# Patient Record
Sex: FEMALE | Race: OTHER | HISPANIC OR LATINO | URBAN - METROPOLITAN AREA
[De-identification: names, ages, dates, MRNs, and addresses within clinical notes are randomized per-mention and may not be internally consistent; named-entity substitution may affect disease eponyms.]

---

## 2018-04-01 ENCOUNTER — EMERGENCY (EMERGENCY)
Facility: HOSPITAL | Age: 63
LOS: 1 days | Discharge: HOME | End: 2018-04-01
Attending: EMERGENCY MEDICINE | Admitting: EMERGENCY MEDICINE
Payer: COMMERCIAL

## 2018-04-01 VITALS
RESPIRATION RATE: 16 BRPM | TEMPERATURE: 98 F | DIASTOLIC BLOOD PRESSURE: 82 MMHG | HEART RATE: 82 BPM | OXYGEN SATURATION: 98 % | SYSTOLIC BLOOD PRESSURE: 117 MMHG

## 2018-04-01 VITALS — HEIGHT: 62 IN | WEIGHT: 149.91 LBS

## 2018-04-01 DIAGNOSIS — Z90.710 ACQUIRED ABSENCE OF BOTH CERVIX AND UTERUS: Chronic | ICD-10-CM

## 2018-04-01 PROCEDURE — 72100 X-RAY EXAM L-S SPINE 2/3 VWS: CPT

## 2018-04-01 PROCEDURE — 94640 AIRWAY INHALATION TREATMENT: CPT

## 2018-04-01 PROCEDURE — 71046 X-RAY EXAM CHEST 2 VIEWS: CPT | Mod: 26

## 2018-04-01 PROCEDURE — 71046 X-RAY EXAM CHEST 2 VIEWS: CPT

## 2018-04-01 PROCEDURE — 99284 EMERGENCY DEPT VISIT MOD MDM: CPT | Mod: 25

## 2018-04-01 PROCEDURE — 72100 X-RAY EXAM L-S SPINE 2/3 VWS: CPT | Mod: 26

## 2018-04-01 PROCEDURE — 72070 X-RAY EXAM THORAC SPINE 2VWS: CPT

## 2018-04-01 PROCEDURE — 99284 EMERGENCY DEPT VISIT MOD MDM: CPT

## 2018-04-01 PROCEDURE — 72070 X-RAY EXAM THORAC SPINE 2VWS: CPT | Mod: 26

## 2018-04-01 RX ORDER — CYCLOBENZAPRINE HYDROCHLORIDE 10 MG/1
1 TABLET, FILM COATED ORAL
Qty: 90 | Refills: 0
Start: 2018-04-01 | End: 2018-04-30

## 2018-04-01 RX ORDER — LORATADINE, PSEUDOEPHEDRINE SULFATE 5; 120 MG/1; MG/1
1 TABLET, FILM COATED, EXTENDED RELEASE ORAL
Qty: 60 | Refills: 0 | OUTPATIENT
Start: 2018-04-01 | End: 2018-04-30

## 2018-04-01 RX ORDER — IBUPROFEN 200 MG
1 TABLET ORAL
Qty: 120 | Refills: 0
Start: 2018-04-01 | End: 2018-04-30

## 2018-04-01 RX ORDER — LORATADINE 10 MG/1
10 TABLET ORAL DAILY
Qty: 0 | Refills: 0 | Status: DISCONTINUED | OUTPATIENT
Start: 2018-04-01 | End: 2018-04-06

## 2018-04-01 RX ORDER — ALBUTEROL 90 UG/1
2 AEROSOL, METERED ORAL
Qty: 1 | Refills: 0 | OUTPATIENT
Start: 2018-04-01 | End: 2018-04-30

## 2018-04-01 RX ORDER — IPRATROPIUM/ALBUTEROL SULFATE 18-103MCG
3 AEROSOL WITH ADAPTER (GRAM) INHALATION ONCE
Qty: 0 | Refills: 0 | Status: COMPLETED | OUTPATIENT
Start: 2018-04-01 | End: 2018-04-01

## 2018-04-01 RX ORDER — CYCLOBENZAPRINE HYDROCHLORIDE 10 MG/1
10 TABLET, FILM COATED ORAL ONCE
Qty: 0 | Refills: 0 | Status: COMPLETED | OUTPATIENT
Start: 2018-04-01 | End: 2018-04-01

## 2018-04-01 RX ORDER — KETOROLAC TROMETHAMINE 30 MG/ML
60 SYRINGE (ML) INJECTION ONCE
Qty: 0 | Refills: 0 | Status: DISCONTINUED | OUTPATIENT
Start: 2018-04-01 | End: 2018-04-01

## 2018-04-01 RX ADMIN — Medication 60 MILLIGRAM(S): at 22:19

## 2018-04-01 RX ADMIN — Medication 3 MILLILITER(S): at 21:45

## 2018-04-01 RX ADMIN — CYCLOBENZAPRINE HYDROCHLORIDE 10 MILLIGRAM(S): 10 TABLET, FILM COATED ORAL at 21:45

## 2018-04-01 RX ADMIN — Medication 60 MILLIGRAM(S): at 21:45

## 2018-04-01 RX ADMIN — Medication 1 TABLET(S): at 23:03

## 2018-04-01 RX ADMIN — LORATADINE 10 MILLIGRAM(S): 10 TABLET ORAL at 21:44

## 2018-04-01 NOTE — ED STATDOCS - CARE PLAN
Principal Discharge DX:	Bronchitis  Secondary Diagnosis:	Back contusion, unspecified laterality, initial encounter

## 2018-04-01 NOTE — ED STATDOCS - PROGRESS NOTE DETAILS
pt has clear lungs after the nebulizer treatment and she will be discharged with outpatient follow up

## 2018-04-01 NOTE — ED STATDOCS - OBJECTIVE STATEMENT
63 y/o F pt presents to the ED with c/o productive cough, nasal congestion, onset Thursday. Pt is also complaining of back pain which started with the cough however, she states that she fell last Tuesday on her back. Pt also had a fever a few days ago. Current smoker. No sick contacts. Denies chills, N/V/D, abd pain. No further complaints at this time.

## 2018-04-01 NOTE — ED ADULT NURSE NOTE - OBJECTIVE STATEMENT
Pt presents with c/o productive cough, nasal congestion x 3days, with associated back pain due to cough, also reports falling last Tuesday on her back. Denies sick contacts. Denies chills, N/V/D, abd pain. Offers no further complaints at this time.  MD order initiated.

## 2018-04-01 NOTE — ED ADULT NURSE REASSESSMENT NOTE - NS ED NURSE REASSESS COMMENT FT1
Pt reports feeling better, discharge instructions given and explained, including discharge medication purpose, dose, frequency and s/e, pt verbalized understanding of instructions, questions encouraged and answered appropriately, pt safely discharged home.

## 2018-11-23 ENCOUNTER — EMERGENCY (EMERGENCY)
Facility: HOSPITAL | Age: 63
LOS: 1 days | Discharge: HOME | End: 2018-11-23
Attending: EMERGENCY MEDICINE
Payer: COMMERCIAL

## 2018-11-23 VITALS — HEIGHT: 63 IN | WEIGHT: 154.98 LBS

## 2018-11-23 DIAGNOSIS — Z90.710 ACQUIRED ABSENCE OF BOTH CERVIX AND UTERUS: Chronic | ICD-10-CM

## 2018-11-23 LAB
ALBUMIN SERPL ELPH-MCNC: 4.3 G/DL — SIGNIFICANT CHANGE UP (ref 3.3–5.2)
ALP SERPL-CCNC: 57 U/L — SIGNIFICANT CHANGE UP (ref 40–120)
ALT FLD-CCNC: 14 U/L — SIGNIFICANT CHANGE UP
ANION GAP SERPL CALC-SCNC: 11 MMOL/L — SIGNIFICANT CHANGE UP (ref 5–17)
APPEARANCE UR: ABNORMAL
AST SERPL-CCNC: 18 U/L — SIGNIFICANT CHANGE UP
BACTERIA # UR AUTO: ABNORMAL
BASOPHILS # BLD AUTO: 0 K/UL — SIGNIFICANT CHANGE UP (ref 0–0.2)
BASOPHILS NFR BLD AUTO: 0.3 % — SIGNIFICANT CHANGE UP (ref 0–2)
BILIRUB SERPL-MCNC: <0.2 MG/DL — LOW (ref 0.4–2)
BILIRUB UR-MCNC: NEGATIVE — SIGNIFICANT CHANGE UP
BUN SERPL-MCNC: 22 MG/DL — HIGH (ref 8–20)
CALCIUM SERPL-MCNC: 9.3 MG/DL — SIGNIFICANT CHANGE UP (ref 8.6–10.2)
CHLORIDE SERPL-SCNC: 103 MMOL/L — SIGNIFICANT CHANGE UP (ref 98–107)
CO2 SERPL-SCNC: 30 MMOL/L — HIGH (ref 22–29)
COLOR SPEC: YELLOW — SIGNIFICANT CHANGE UP
COMMENT - URINE: SIGNIFICANT CHANGE UP
CREAT SERPL-MCNC: 1.15 MG/DL — SIGNIFICANT CHANGE UP (ref 0.5–1.3)
DIFF PNL FLD: ABNORMAL
EOSINOPHIL # BLD AUTO: 0.3 K/UL — SIGNIFICANT CHANGE UP (ref 0–0.5)
EOSINOPHIL NFR BLD AUTO: 7.6 % — HIGH (ref 0–6)
EPI CELLS # UR: SIGNIFICANT CHANGE UP
GLUCOSE SERPL-MCNC: 108 MG/DL — SIGNIFICANT CHANGE UP (ref 70–115)
GLUCOSE UR QL: NEGATIVE MG/DL — SIGNIFICANT CHANGE UP
HCT VFR BLD CALC: 39.1 % — SIGNIFICANT CHANGE UP (ref 37–47)
HGB BLD-MCNC: 12.5 G/DL — SIGNIFICANT CHANGE UP (ref 12–16)
KETONES UR-MCNC: ABNORMAL
LEUKOCYTE ESTERASE UR-ACNC: ABNORMAL
LIDOCAIN IGE QN: 42 U/L — SIGNIFICANT CHANGE UP (ref 22–51)
LYMPHOCYTES # BLD AUTO: 2 K/UL — SIGNIFICANT CHANGE UP (ref 1–4.8)
LYMPHOCYTES # BLD AUTO: 50.8 % — SIGNIFICANT CHANGE UP (ref 20–55)
MCHC RBC-ENTMCNC: 27.4 PG — SIGNIFICANT CHANGE UP (ref 27–31)
MCHC RBC-ENTMCNC: 32 G/DL — SIGNIFICANT CHANGE UP (ref 32–36)
MCV RBC AUTO: 85.6 FL — SIGNIFICANT CHANGE UP (ref 81–99)
MONOCYTES # BLD AUTO: 0.3 K/UL — SIGNIFICANT CHANGE UP (ref 0–0.8)
MONOCYTES NFR BLD AUTO: 8.6 % — SIGNIFICANT CHANGE UP (ref 3–10)
NEUTROPHILS # BLD AUTO: 1.3 K/UL — LOW (ref 1.8–8)
NEUTROPHILS NFR BLD AUTO: 32.7 % — LOW (ref 37–73)
NITRITE UR-MCNC: NEGATIVE — SIGNIFICANT CHANGE UP
PH UR: 8 — SIGNIFICANT CHANGE UP (ref 5–8)
PLATELET # BLD AUTO: 286 K/UL — SIGNIFICANT CHANGE UP (ref 150–400)
POTASSIUM SERPL-MCNC: 4.2 MMOL/L — SIGNIFICANT CHANGE UP (ref 3.5–5.3)
POTASSIUM SERPL-SCNC: 4.2 MMOL/L — SIGNIFICANT CHANGE UP (ref 3.5–5.3)
PROT SERPL-MCNC: 6.9 G/DL — SIGNIFICANT CHANGE UP (ref 6.6–8.7)
PROT UR-MCNC: 15 MG/DL
RBC # BLD: 4.57 M/UL — SIGNIFICANT CHANGE UP (ref 4.4–5.2)
RBC # FLD: 13.3 % — SIGNIFICANT CHANGE UP (ref 11–15.6)
RBC CASTS # UR COMP ASSIST: SIGNIFICANT CHANGE UP /HPF (ref 0–4)
SODIUM SERPL-SCNC: 144 MMOL/L — SIGNIFICANT CHANGE UP (ref 135–145)
SP GR SPEC: 1.01 — SIGNIFICANT CHANGE UP (ref 1.01–1.02)
UROBILINOGEN FLD QL: NEGATIVE MG/DL — SIGNIFICANT CHANGE UP
WBC # BLD: 3.9 K/UL — LOW (ref 4.8–10.8)
WBC # FLD AUTO: 3.9 K/UL — LOW (ref 4.8–10.8)
WBC UR QL: SIGNIFICANT CHANGE UP

## 2018-11-23 PROCEDURE — 99284 EMERGENCY DEPT VISIT MOD MDM: CPT

## 2018-11-23 PROCEDURE — 74177 CT ABD & PELVIS W/CONTRAST: CPT | Mod: 26

## 2018-11-23 RX ORDER — SODIUM CHLORIDE 9 MG/ML
3 INJECTION INTRAMUSCULAR; INTRAVENOUS; SUBCUTANEOUS ONCE
Qty: 0 | Refills: 0 | Status: COMPLETED | OUTPATIENT
Start: 2018-11-23 | End: 2018-11-23

## 2018-11-23 RX ORDER — KETOROLAC TROMETHAMINE 30 MG/ML
15 SYRINGE (ML) INJECTION ONCE
Qty: 0 | Refills: 0 | Status: DISCONTINUED | OUTPATIENT
Start: 2018-11-23 | End: 2018-11-23

## 2018-11-23 RX ADMIN — Medication 15 MILLIGRAM(S): at 23:16

## 2018-11-23 RX ADMIN — Medication 15 MILLIGRAM(S): at 21:31

## 2018-11-23 RX ADMIN — SODIUM CHLORIDE 3 MILLILITER(S): 9 INJECTION INTRAMUSCULAR; INTRAVENOUS; SUBCUTANEOUS at 20:32

## 2018-11-23 NOTE — ED PROVIDER NOTE - OBJECTIVE STATEMENT
Patient is a 62 y/o female with a pmhx of hysterectomy presenting with right sided abdominal pain that started this morning. Patient states abdominal pain radiates to the right lower back. Patient states the pain has been coming and going, but has become more severe since it started. Patient denies fevers. Patient denies cp, SOB, nausea, vomiting, diarrhea, constipation, melena, rectal bleeding, hematuria, dysuria. Patient denies past hx of kidney stones.

## 2018-11-23 NOTE — ED STATDOCS - OBJECTIVE STATEMENT
Telemedicine assessment was conducted (using real time 2 way audio-video technology) by Dr. Enmanuel Brady located at [address] [place] [address]  ++++++++++++++++++++++++  Pertinent patient history and initial plan: 64 y/o F pt with hx of hysterectomy (2012) presents to ED c/o intermittent R sided abdominal pain, with associated R flank pain from this morning. Pt's pain aggravates when lying down. Pt reports there has been some liquid bubbles in her urine. Pt states she drinks a lot of water. Pt does not currently take any medications daily. Pt took Ibuprofen and Aleve for her pain; no relief. NKDA. Denies fever, and rash to abdomen. No further complaints at this time.  PLAN: Basic labs, UA Telemedicine assessment was conducted (using real time 2 way audio-video technology) by Dr. Enmanuel Brady located at [address] [place] [address]  ++++++++++++++++++++++++  Pertinent patient history and initial plan: 62 y/o F pt with hx of hysterectomy (2012) presents to ED c/o intermittent R sided abdominal pain, with associated R flank pain from this morning. Pt's pain aggravates when lying down. Pt reports there has been some liquid bubbles in her urine. Pt states she drinks a lot of water. Pt does not currently take any medications daily. Pt took Ibuprofen and Aleve for her pain; no relief. NKDA. Denies fever, and rash to abdomen. No further complaints at this time.  PLAN: Basic labs, UA, imaging to be determined by on site physician    Patient seen by me in intake for initial assessment and ordering. Physician on site to follow results and further evaluate and treat patient.

## 2018-11-23 NOTE — ED ADULT NURSE NOTE - OBJECTIVE STATEMENT
assumed care of pt in room. pt a&ox3. pt c/o RLQ abdominal pain radiating to lower back starting this morning. denies chest pain or sob. last BM was yesterday, no diarrhea.

## 2018-11-23 NOTE — ED PROVIDER NOTE - PHYSICAL EXAMINATION
Const: Awake, alert and oriented. In no acute distress. Well appearing.  HEENT: NC/AT. Moist mucous membranes.  Eyes: Conjunctiva pink, sclera white bilaterally, EOMI.  Neck:. Soft and supple. Full ROM without pain.  Cardiac:  +S1/S2. No murmurs.   Resp: Speaking in full sentences. No evidence of respiratory distress. No wheezes, rales or rhonchi.  Abd: Soft, RLQ tenderness on palpation, non-distended. Normal bowel sounds in all 4 quadrants. No guarding or rebound.  Back: Spine midline and non-tender. No CVAT.  Skin: No rashes, abrasions or lacerations.  Lymph: No cervical lymphadenopathy.  Neuro: Awake, alert & oriented x 3. Moves all extremities symmetrically.

## 2018-11-23 NOTE — ED ADULT NURSE NOTE - NSIMPLEMENTINTERV_GEN_ALL_ED
Implemented All Universal Safety Interventions:  [place] to call system. Call bell, personal items and telephone within reach. Instruct patient to call for assistance. Room bathroom lighting operational. Non-slip footwear when patient is off stretcher. Physically safe environment: no spills, clutter or unnecessary equipment. Stretcher in lowest position, wheels locked, appropriate side rails in place.

## 2018-11-23 NOTE — ED PROVIDER NOTE - PROGRESS NOTE DETAILS
On re-assessment of patients abdomen, no tenderness on palpation, reviewed ct abd/pelvis, lab work and urine, GI referral, pt explained results and d/c instructions

## 2018-11-23 NOTE — ED PROVIDER NOTE - ATTENDING CONTRIBUTION TO CARE
Patient is a 62 y/o female seen and evaluated with ACP  Presents for abdominal pain, right sided  onset this am, radiates to back, intermittent  PMH includes hysterectomy  vitals reviewed, exam as documented  line, labs, urine for uti/pyelo, Ct for obstruction, appy  treat swms, check rsults, reassess

## 2018-11-24 VITALS
SYSTOLIC BLOOD PRESSURE: 146 MMHG | HEART RATE: 72 BPM | RESPIRATION RATE: 18 BRPM | TEMPERATURE: 98 F | DIASTOLIC BLOOD PRESSURE: 74 MMHG | OXYGEN SATURATION: 99 %

## 2018-11-24 LAB
CULTURE RESULTS: SIGNIFICANT CHANGE UP
SPECIMEN SOURCE: SIGNIFICANT CHANGE UP

## 2018-11-24 PROCEDURE — 80053 COMPREHEN METABOLIC PANEL: CPT

## 2018-11-24 PROCEDURE — 74177 CT ABD & PELVIS W/CONTRAST: CPT

## 2018-11-24 PROCEDURE — 36415 COLL VENOUS BLD VENIPUNCTURE: CPT

## 2018-11-24 PROCEDURE — 99284 EMERGENCY DEPT VISIT MOD MDM: CPT | Mod: 25

## 2018-11-24 PROCEDURE — T1013: CPT

## 2018-11-24 PROCEDURE — 87086 URINE CULTURE/COLONY COUNT: CPT

## 2018-11-24 PROCEDURE — 85027 COMPLETE CBC AUTOMATED: CPT

## 2018-11-24 PROCEDURE — 83690 ASSAY OF LIPASE: CPT

## 2018-11-24 PROCEDURE — 81001 URINALYSIS AUTO W/SCOPE: CPT

## 2018-11-24 PROCEDURE — 96374 THER/PROPH/DIAG INJ IV PUSH: CPT | Mod: XU

## 2018-12-04 ENCOUNTER — APPOINTMENT (OUTPATIENT)
Dept: GASTROENTEROLOGY | Facility: CLINIC | Age: 63
End: 2018-12-04

## 2018-12-17 ENCOUNTER — APPOINTMENT (OUTPATIENT)
Dept: UROGYNECOLOGY | Facility: CLINIC | Age: 63
End: 2018-12-17

## 2019-07-17 ENCOUNTER — EMERGENCY (EMERGENCY)
Facility: HOSPITAL | Age: 64
LOS: 1 days | Discharge: HOME | End: 2019-07-17
Attending: EMERGENCY MEDICINE
Payer: COMMERCIAL

## 2019-07-17 VITALS
RESPIRATION RATE: 20 BRPM | HEIGHT: 55 IN | SYSTOLIC BLOOD PRESSURE: 118 MMHG | OXYGEN SATURATION: 98 % | DIASTOLIC BLOOD PRESSURE: 63 MMHG | TEMPERATURE: 98 F | WEIGHT: 151.9 LBS | HEART RATE: 85 BPM

## 2019-07-17 DIAGNOSIS — Z90.710 ACQUIRED ABSENCE OF BOTH CERVIX AND UTERUS: Chronic | ICD-10-CM

## 2019-07-17 PROCEDURE — 73080 X-RAY EXAM OF ELBOW: CPT

## 2019-07-17 PROCEDURE — 73080 X-RAY EXAM OF ELBOW: CPT | Mod: 26,LT

## 2019-07-17 PROCEDURE — 99284 EMERGENCY DEPT VISIT MOD MDM: CPT

## 2019-07-17 PROCEDURE — 74176 CT ABD & PELVIS W/O CONTRAST: CPT | Mod: 26

## 2019-07-17 PROCEDURE — 70450 CT HEAD/BRAIN W/O DYE: CPT | Mod: 26

## 2019-07-17 PROCEDURE — 72125 CT NECK SPINE W/O DYE: CPT | Mod: 26

## 2019-07-17 PROCEDURE — 72125 CT NECK SPINE W/O DYE: CPT

## 2019-07-17 PROCEDURE — 70450 CT HEAD/BRAIN W/O DYE: CPT

## 2019-07-17 PROCEDURE — 74176 CT ABD & PELVIS W/O CONTRAST: CPT

## 2019-07-17 RX ORDER — ACETAMINOPHEN 500 MG
650 TABLET ORAL ONCE
Refills: 0 | Status: COMPLETED | OUTPATIENT
Start: 2019-07-17 | End: 2019-07-17

## 2019-07-17 RX ADMIN — Medication 650 MILLIGRAM(S): at 14:20

## 2019-07-17 NOTE — ED STATDOCS - PROGRESS NOTE DETAILS
Pt moved form intake Room. Pt seen and evaluated by intake Physician. HPI, Physical examination performed by intake Physician . Note reviewed and followup examination performed by me consistent with initial assessment. Agrees with intake Physician plan and tests. Head, Neck, abdomen and pelvis CT normal . Left elbow x-ray normal. Pt made aware of her result and D/C in stable condition. Pt advised about closed head injury and will followup if any symptoms of Intracranial injury kylie. Pt D/C and will F/U as discussed.

## 2019-07-17 NOTE — ED STATDOCS - OBJECTIVE STATEMENT
64 y/o F pt with no significant PMHx presents to the ED c/o back pain s/p mechanical fall. Falling backward hitting head HA, Low back pain, andelbow pain. no belly pain, no CP, no SOb   She reports that 64 y/o F pt with no significant PMHx presents to the ED c/o back pain and elbow pain s/p mechanical fall. Patient fell backwards out of chair, hitting head and lower back. Associated sx of HA. Denies belly pain, CP,  SOB. No further acute complaints at this time.

## 2019-07-17 NOTE — ED STATDOCS - CLINICAL SUMMARY MEDICAL DECISION MAKING FREE TEXT BOX
64 y/o F patient c/o lower back pain and L elbow pain s/p mechanical fall. Will get CT scan and reassess.

## 2019-07-17 NOTE — ED ADULT TRIAGE NOTE - CHIEF COMPLAINT QUOTE
Patient fell off chair at work ([employer]) landing on her back and hitting her head.  Patient ambulatory into ED.

## 2019-07-17 NOTE — ED STATDOCS - CARE PLAN
Principal Discharge DX:	Fall, initial encounter  Assessment and plan of treatment:	Continue with Naproxen as discussed   F/U with PCP  Secondary Diagnosis:	Closed head injury, initial encounter  Secondary Diagnosis:	Neck pain  Secondary Diagnosis:	Elbow pain, left

## 2019-07-17 NOTE — ED STATDOCS - ATTENDING CONTRIBUTION TO CARE
I, Kody Rodriguez, performed the initial face to face bedside interview with this patient regarding history of present illness, review of symptoms and relevant past medical, social and family history.  I completed an independent physical examination.  I was the initial provider who evaluated this patient. I have signed out the follow up of any pending tests (i.e. labs, radiological studies) to the ACP.  I have communicated the patient’s plan of care and disposition with the ACP.  The history, relevant review of systems, past medical and surgical history, medical decision making, and physical examination was documented by the scribe in my presence and I attest to the accuracy of the documentation.

## 2019-07-17 NOTE — ED ADULT NURSE NOTE - NSIMPLEMENTINTERV_GEN_ALL_ED
Implemented All Universal Safety Interventions:  [place] to call system. Call bell, personal items and telephone within reach. Instruct patient to call for assistance. Room bathroom lighting operational. Non-slip footwear when patient is off stretcher. Physically safe environment: no spills, clutter or unnecessary equipment. Stretcher in lowest position, wheels locked, appropriate side rails in place.

## 2019-09-23 ENCOUNTER — APPOINTMENT (OUTPATIENT)
Dept: UROGYNECOLOGY | Facility: CLINIC | Age: 64
End: 2019-09-23

## 2021-08-18 ENCOUNTER — EMERGENCY (EMERGENCY)
Facility: HOSPITAL | Age: 66
LOS: 1 days | Discharge: HOME | End: 2021-08-18
Attending: EMERGENCY MEDICINE
Payer: COMMERCIAL

## 2021-08-18 VITALS
WEIGHT: 147.93 LBS | HEIGHT: 64 IN | HEART RATE: 76 BPM | SYSTOLIC BLOOD PRESSURE: 134 MMHG | RESPIRATION RATE: 18 BRPM | DIASTOLIC BLOOD PRESSURE: 89 MMHG | OXYGEN SATURATION: 100 % | TEMPERATURE: 98 F

## 2021-08-18 DIAGNOSIS — Z90.710 ACQUIRED ABSENCE OF BOTH CERVIX AND UTERUS: Chronic | ICD-10-CM

## 2021-08-18 LAB
ALBUMIN SERPL ELPH-MCNC: 4.4 G/DL — SIGNIFICANT CHANGE UP (ref 3.3–5.2)
ALP SERPL-CCNC: 57 U/L — SIGNIFICANT CHANGE UP (ref 40–120)
ALT FLD-CCNC: 12 U/L — SIGNIFICANT CHANGE UP
ANION GAP SERPL CALC-SCNC: 9 MMOL/L — SIGNIFICANT CHANGE UP (ref 5–17)
APPEARANCE UR: CLEAR — SIGNIFICANT CHANGE UP
AST SERPL-CCNC: 19 U/L — SIGNIFICANT CHANGE UP
BACTERIA # UR AUTO: ABNORMAL
BASOPHILS # BLD AUTO: 0.01 K/UL — SIGNIFICANT CHANGE UP (ref 0–0.2)
BASOPHILS NFR BLD AUTO: 0.3 % — SIGNIFICANT CHANGE UP (ref 0–2)
BILIRUB SERPL-MCNC: <0.2 MG/DL — LOW (ref 0.4–2)
BILIRUB UR-MCNC: NEGATIVE — SIGNIFICANT CHANGE UP
BUN SERPL-MCNC: 14 MG/DL — SIGNIFICANT CHANGE UP (ref 8–20)
CALCIUM SERPL-MCNC: 9.3 MG/DL — SIGNIFICANT CHANGE UP (ref 8.6–10.2)
CHLORIDE SERPL-SCNC: 106 MMOL/L — SIGNIFICANT CHANGE UP (ref 98–107)
CO2 SERPL-SCNC: 27 MMOL/L — SIGNIFICANT CHANGE UP (ref 22–29)
COLOR SPEC: YELLOW — SIGNIFICANT CHANGE UP
CREAT SERPL-MCNC: 0.81 MG/DL — SIGNIFICANT CHANGE UP (ref 0.5–1.3)
CRP SERPL-MCNC: <4 MG/L — SIGNIFICANT CHANGE UP
DIFF PNL FLD: ABNORMAL
EOSINOPHIL # BLD AUTO: 0.18 K/UL — SIGNIFICANT CHANGE UP (ref 0–0.5)
EOSINOPHIL NFR BLD AUTO: 5.5 % — SIGNIFICANT CHANGE UP (ref 0–6)
EPI CELLS # UR: SIGNIFICANT CHANGE UP
GLUCOSE SERPL-MCNC: 83 MG/DL — SIGNIFICANT CHANGE UP (ref 70–99)
GLUCOSE UR QL: NEGATIVE MG/DL — SIGNIFICANT CHANGE UP
HCG SERPL-ACNC: <4 MIU/ML — SIGNIFICANT CHANGE UP
HCT VFR BLD CALC: 39 % — SIGNIFICANT CHANGE UP (ref 34.5–45)
HGB BLD-MCNC: 12.5 G/DL — SIGNIFICANT CHANGE UP (ref 11.5–15.5)
IMM GRANULOCYTES NFR BLD AUTO: 0.3 % — SIGNIFICANT CHANGE UP (ref 0–1.5)
KETONES UR-MCNC: NEGATIVE — SIGNIFICANT CHANGE UP
LEUKOCYTE ESTERASE UR-ACNC: ABNORMAL
LIDOCAIN IGE QN: 36 U/L — SIGNIFICANT CHANGE UP (ref 22–51)
LYMPHOCYTES # BLD AUTO: 1.65 K/UL — SIGNIFICANT CHANGE UP (ref 1–3.3)
LYMPHOCYTES # BLD AUTO: 50.6 % — HIGH (ref 13–44)
MAGNESIUM SERPL-MCNC: 2.4 MG/DL — SIGNIFICANT CHANGE UP (ref 1.6–2.6)
MCHC RBC-ENTMCNC: 27.3 PG — SIGNIFICANT CHANGE UP (ref 27–34)
MCHC RBC-ENTMCNC: 32.1 GM/DL — SIGNIFICANT CHANGE UP (ref 32–36)
MCV RBC AUTO: 85.2 FL — SIGNIFICANT CHANGE UP (ref 80–100)
MONOCYTES # BLD AUTO: 0.29 K/UL — SIGNIFICANT CHANGE UP (ref 0–0.9)
MONOCYTES NFR BLD AUTO: 8.9 % — SIGNIFICANT CHANGE UP (ref 2–14)
NEUTROPHILS # BLD AUTO: 1.12 K/UL — LOW (ref 1.8–7.4)
NEUTROPHILS NFR BLD AUTO: 34.4 % — LOW (ref 43–77)
NITRITE UR-MCNC: NEGATIVE — SIGNIFICANT CHANGE UP
PH UR: 6.5 — SIGNIFICANT CHANGE UP (ref 5–8)
PLATELET # BLD AUTO: 262 K/UL — SIGNIFICANT CHANGE UP (ref 150–400)
POTASSIUM SERPL-MCNC: 3.8 MMOL/L — SIGNIFICANT CHANGE UP (ref 3.5–5.3)
POTASSIUM SERPL-SCNC: 3.8 MMOL/L — SIGNIFICANT CHANGE UP (ref 3.5–5.3)
PROCALCITONIN SERPL-MCNC: 0.04 NG/ML — SIGNIFICANT CHANGE UP (ref 0.02–0.1)
PROT SERPL-MCNC: 6.8 G/DL — SIGNIFICANT CHANGE UP (ref 6.6–8.7)
PROT UR-MCNC: NEGATIVE MG/DL — SIGNIFICANT CHANGE UP
RAPID RVP RESULT: SIGNIFICANT CHANGE UP
RBC # BLD: 4.58 M/UL — SIGNIFICANT CHANGE UP (ref 3.8–5.2)
RBC # FLD: 13 % — SIGNIFICANT CHANGE UP (ref 10.3–14.5)
RBC CASTS # UR COMP ASSIST: SIGNIFICANT CHANGE UP /HPF (ref 0–4)
SARS-COV-2 RNA SPEC QL NAA+PROBE: DETECTED
SODIUM SERPL-SCNC: 142 MMOL/L — SIGNIFICANT CHANGE UP (ref 135–145)
SP GR SPEC: 1.01 — SIGNIFICANT CHANGE UP (ref 1.01–1.02)
UROBILINOGEN FLD QL: NEGATIVE MG/DL — SIGNIFICANT CHANGE UP
WBC # BLD: 3.26 K/UL — LOW (ref 3.8–10.5)
WBC # FLD AUTO: 3.26 K/UL — LOW (ref 3.8–10.5)
WBC UR QL: SIGNIFICANT CHANGE UP

## 2021-08-18 PROCEDURE — 71260 CT THORAX DX C+: CPT | Mod: 26,MA

## 2021-08-18 PROCEDURE — 99285 EMERGENCY DEPT VISIT HI MDM: CPT

## 2021-08-18 PROCEDURE — 71045 X-RAY EXAM CHEST 1 VIEW: CPT | Mod: 26

## 2021-08-18 PROCEDURE — 74177 CT ABD & PELVIS W/CONTRAST: CPT | Mod: 26,MA

## 2021-08-18 NOTE — ED PROVIDER NOTE - OBJECTIVE STATEMENT
66yof w/ no PMH referred to ED by PMD for low WBC. She has been feeling weak, tired and generally unwell for about two weeks now, poor appetite, intermittent dull RUQ pain that comes and goes without any provoking or palliating factors. Had labs done by her PMD which revealed a WBC 2.5k. Endorses some mild congestion and some cervical adenopathy but no cough, chills, fever, chest pain, shortness of breath.

## 2021-08-18 NOTE — ED ADULT TRIAGE NOTE - CHIEF COMPLAINT QUOTE
pt reports WBC 2.5 at PMD, feeling no energy, dizzy, sweating a lot, RUQ pain, SOB. symptoms x2 weeks

## 2021-08-18 NOTE — ED PROVIDER NOTE - NSFOLLOWUPINSTRUCTIONS_ED_ALL_ED_FT
Start protonix 40mg once daily before breakfast for 14 days.   Follow up with your primary doctor and hematology as arranged  Maintain isolation precautions until repeat COVID testing is negative or 10 days from positive result.

## 2021-08-18 NOTE — ED PROVIDER NOTE - ENMT, MLM
Airway patent, Nasal mucosa clear. Mouth with normal mucosa. Throat has no vesicles, no oropharyngeal exudates and uvula is midline. Small, freely mobile cervical nodes

## 2021-08-18 NOTE — ED PROVIDER NOTE - PATIENT PORTAL LINK FT
You can access the FollowMyHealth Patient Portal offered by [facility] by registering at the following website: [email or web address]. By joining [employer]’s FollowMyHealth portal, you will also be able to view your health information using other applications (apps) compatible with our system.

## 2021-08-18 NOTE — ED PROVIDER NOTE - CLINICAL SUMMARY MEDICAL DECISION MAKING FREE TEXT BOX
General malaise with leukopenia on outside labs, maybe some occasional cervical adenopathy but overall unremarkable exam, WBC 3.2 today with ANC>1000, no blasts reported on differential. CT performed shows no evidence of infection in chest or abdomen, no suspicious lymphadenopathy. Incidental finding of gastritis which likely explains bloating/abd discomfort. Will start protonix. Sent HIV, EBV, Hepatitis to evaluate for other causes of leukopenia, but found to be COVID positive. Pt has follow up with hematology arranged, made aware of all results and pending tests to follow up.

## 2021-08-19 VITALS
OXYGEN SATURATION: 98 % | HEART RATE: 66 BPM | RESPIRATION RATE: 19 BRPM | SYSTOLIC BLOOD PRESSURE: 125 MMHG | DIASTOLIC BLOOD PRESSURE: 84 MMHG | TEMPERATURE: 98 F

## 2021-08-19 LAB
EBV EA AB SER IA-ACNC: 8.8 U/ML — SIGNIFICANT CHANGE UP
EBV EA AB TITR SER IF: POSITIVE
EBV EA IGG SER-ACNC: NEGATIVE — SIGNIFICANT CHANGE UP
EBV NA IGG SER IA-ACNC: >600 U/ML — HIGH
EBV PATRN SPEC IB-IMP: SIGNIFICANT CHANGE UP
EBV VCA IGG AVIDITY SER QL IA: POSITIVE
EBV VCA IGM SER IA-ACNC: <10 U/ML — SIGNIFICANT CHANGE UP
EBV VCA IGM SER IA-ACNC: >750 U/ML — HIGH
EBV VCA IGM TITR FLD: NEGATIVE — SIGNIFICANT CHANGE UP
HAV IGM SER-ACNC: SIGNIFICANT CHANGE UP
HBV CORE IGM SER-ACNC: SIGNIFICANT CHANGE UP
HBV SURFACE AG SER-ACNC: SIGNIFICANT CHANGE UP
HCV AB S/CO SERPL IA: 0.15 S/CO — SIGNIFICANT CHANGE UP (ref 0–0.99)
HCV AB SERPL-IMP: SIGNIFICANT CHANGE UP
HIV 1 & 2 AB SERPL IA.RAPID: SIGNIFICANT CHANGE UP

## 2021-08-19 PROCEDURE — 81001 URINALYSIS AUTO W/SCOPE: CPT

## 2021-08-19 PROCEDURE — 86703 HIV-1/HIV-2 1 RESULT ANTBDY: CPT

## 2021-08-19 PROCEDURE — 83690 ASSAY OF LIPASE: CPT

## 2021-08-19 PROCEDURE — 87086 URINE CULTURE/COLONY COUNT: CPT

## 2021-08-19 PROCEDURE — 71260 CT THORAX DX C+: CPT | Mod: MA

## 2021-08-19 PROCEDURE — 84702 CHORIONIC GONADOTROPIN TEST: CPT

## 2021-08-19 PROCEDURE — 74177 CT ABD & PELVIS W/CONTRAST: CPT | Mod: MA

## 2021-08-19 PROCEDURE — 86664 EPSTEIN-BARR NUCLEAR ANTIGEN: CPT

## 2021-08-19 PROCEDURE — 86663 EPSTEIN-BARR ANTIBODY: CPT

## 2021-08-19 PROCEDURE — 36415 COLL VENOUS BLD VENIPUNCTURE: CPT

## 2021-08-19 PROCEDURE — 86140 C-REACTIVE PROTEIN: CPT

## 2021-08-19 PROCEDURE — 84145 PROCALCITONIN (PCT): CPT

## 2021-08-19 PROCEDURE — 85025 COMPLETE CBC W/AUTO DIFF WBC: CPT

## 2021-08-19 PROCEDURE — 83735 ASSAY OF MAGNESIUM: CPT

## 2021-08-19 PROCEDURE — 86665 EPSTEIN-BARR CAPSID VCA: CPT

## 2021-08-19 PROCEDURE — 80053 COMPREHEN METABOLIC PANEL: CPT

## 2021-08-19 PROCEDURE — 80074 ACUTE HEPATITIS PANEL: CPT

## 2021-08-19 PROCEDURE — 71045 X-RAY EXAM CHEST 1 VIEW: CPT

## 2021-08-19 PROCEDURE — 0225U NFCT DS DNA&RNA 21 SARSCOV2: CPT

## 2021-08-19 PROCEDURE — 99284 EMERGENCY DEPT VISIT MOD MDM: CPT | Mod: 25

## 2021-08-19 RX ORDER — PANTOPRAZOLE SODIUM 20 MG/1
1 TABLET, DELAYED RELEASE ORAL
Qty: 14 | Refills: 0
Start: 2021-08-19 | End: 2021-09-01

## 2021-08-20 LAB
CULTURE RESULTS: SIGNIFICANT CHANGE UP
SPECIMEN SOURCE: SIGNIFICANT CHANGE UP

## 2021-09-08 ENCOUNTER — APPOINTMENT (OUTPATIENT)
Dept: GASTROENTEROLOGY | Facility: CLINIC | Age: 66
End: 2021-09-08

## 2021-10-05 ENCOUNTER — EMERGENCY (EMERGENCY)
Facility: HOSPITAL | Age: 66
LOS: 1 days | Discharge: HOME | End: 2021-10-05
Attending: EMERGENCY MEDICINE
Payer: COMMERCIAL

## 2021-10-05 VITALS
SYSTOLIC BLOOD PRESSURE: 147 MMHG | HEART RATE: 62 BPM | RESPIRATION RATE: 18 BRPM | HEIGHT: 64 IN | TEMPERATURE: 98 F | OXYGEN SATURATION: 100 % | WEIGHT: 149.91 LBS | DIASTOLIC BLOOD PRESSURE: 89 MMHG

## 2021-10-05 DIAGNOSIS — Z90.710 ACQUIRED ABSENCE OF BOTH CERVIX AND UTERUS: Chronic | ICD-10-CM

## 2021-10-05 LAB
ALBUMIN SERPL ELPH-MCNC: 4.4 G/DL — SIGNIFICANT CHANGE UP (ref 3.3–5.2)
ALP SERPL-CCNC: 56 U/L — SIGNIFICANT CHANGE UP (ref 40–120)
ALT FLD-CCNC: 11 U/L — SIGNIFICANT CHANGE UP
ANION GAP SERPL CALC-SCNC: 11 MMOL/L — SIGNIFICANT CHANGE UP (ref 5–17)
AST SERPL-CCNC: 17 U/L — SIGNIFICANT CHANGE UP
BASOPHILS # BLD AUTO: 0 K/UL — SIGNIFICANT CHANGE UP (ref 0–0.2)
BASOPHILS NFR BLD AUTO: 0 % — SIGNIFICANT CHANGE UP (ref 0–2)
BILIRUB SERPL-MCNC: <0.2 MG/DL — LOW (ref 0.4–2)
BUN SERPL-MCNC: 12.6 MG/DL — SIGNIFICANT CHANGE UP (ref 8–20)
CALCIUM SERPL-MCNC: 9.6 MG/DL — SIGNIFICANT CHANGE UP (ref 8.6–10.2)
CHLORIDE SERPL-SCNC: 106 MMOL/L — SIGNIFICANT CHANGE UP (ref 98–107)
CO2 SERPL-SCNC: 28 MMOL/L — SIGNIFICANT CHANGE UP (ref 22–29)
CREAT SERPL-MCNC: 0.87 MG/DL — SIGNIFICANT CHANGE UP (ref 0.5–1.3)
EOSINOPHIL # BLD AUTO: 0.15 K/UL — SIGNIFICANT CHANGE UP (ref 0–0.5)
EOSINOPHIL NFR BLD AUTO: 4.5 % — SIGNIFICANT CHANGE UP (ref 0–6)
GIANT PLATELETS BLD QL SMEAR: PRESENT — SIGNIFICANT CHANGE UP
GLUCOSE SERPL-MCNC: 104 MG/DL — HIGH (ref 70–99)
HCT VFR BLD CALC: 38 % — SIGNIFICANT CHANGE UP (ref 34.5–45)
HGB BLD-MCNC: 12.1 G/DL — SIGNIFICANT CHANGE UP (ref 11.5–15.5)
LYMPHOCYTES # BLD AUTO: 1.38 K/UL — SIGNIFICANT CHANGE UP (ref 1–3.3)
LYMPHOCYTES # BLD AUTO: 40.2 % — SIGNIFICANT CHANGE UP (ref 13–44)
MANUAL SMEAR VERIFICATION: SIGNIFICANT CHANGE UP
MCHC RBC-ENTMCNC: 27.4 PG — SIGNIFICANT CHANGE UP (ref 27–34)
MCHC RBC-ENTMCNC: 31.8 GM/DL — LOW (ref 32–36)
MCV RBC AUTO: 86.2 FL — SIGNIFICANT CHANGE UP (ref 80–100)
MONOCYTES # BLD AUTO: 0.21 K/UL — SIGNIFICANT CHANGE UP (ref 0–0.9)
MONOCYTES NFR BLD AUTO: 6.2 % — SIGNIFICANT CHANGE UP (ref 2–14)
NEUTROPHILS # BLD AUTO: 1.65 K/UL — LOW (ref 1.8–7.4)
NEUTROPHILS NFR BLD AUTO: 48.2 % — SIGNIFICANT CHANGE UP (ref 43–77)
PLAT MORPH BLD: NORMAL — SIGNIFICANT CHANGE UP
PLATELET # BLD AUTO: 279 K/UL — SIGNIFICANT CHANGE UP (ref 150–400)
POTASSIUM SERPL-MCNC: 3.7 MMOL/L — SIGNIFICANT CHANGE UP (ref 3.5–5.3)
POTASSIUM SERPL-SCNC: 3.7 MMOL/L — SIGNIFICANT CHANGE UP (ref 3.5–5.3)
PROT SERPL-MCNC: 6.6 G/DL — SIGNIFICANT CHANGE UP (ref 6.6–8.7)
RBC # BLD: 4.41 M/UL — SIGNIFICANT CHANGE UP (ref 3.8–5.2)
RBC # FLD: 12.9 % — SIGNIFICANT CHANGE UP (ref 10.3–14.5)
RBC BLD AUTO: NORMAL — SIGNIFICANT CHANGE UP
SMUDGE CELLS # BLD: PRESENT — SIGNIFICANT CHANGE UP
SODIUM SERPL-SCNC: 145 MMOL/L — SIGNIFICANT CHANGE UP (ref 135–145)
VARIANT LYMPHS # BLD: 0.9 % — SIGNIFICANT CHANGE UP (ref 0–6)
WBC # BLD: 3.43 K/UL — LOW (ref 3.8–10.5)
WBC # FLD AUTO: 3.43 K/UL — LOW (ref 3.8–10.5)

## 2021-10-05 PROCEDURE — 99285 EMERGENCY DEPT VISIT HI MDM: CPT

## 2021-10-05 PROCEDURE — 93010 ELECTROCARDIOGRAM REPORT: CPT

## 2021-10-05 RX ORDER — METOCLOPRAMIDE HCL 10 MG
10 TABLET ORAL ONCE
Refills: 0 | Status: COMPLETED | OUTPATIENT
Start: 2021-10-05 | End: 2021-10-05

## 2021-10-05 RX ORDER — SODIUM CHLORIDE 9 MG/ML
1000 INJECTION, SOLUTION INTRAVENOUS ONCE
Refills: 0 | Status: COMPLETED | OUTPATIENT
Start: 2021-10-05 | End: 2021-10-05

## 2021-10-05 RX ORDER — ONDANSETRON 8 MG/1
4 TABLET, FILM COATED ORAL ONCE
Refills: 0 | Status: COMPLETED | OUTPATIENT
Start: 2021-10-05 | End: 2021-10-05

## 2021-10-05 RX ORDER — ALPRAZOLAM 0.25 MG
0.25 TABLET ORAL ONCE
Refills: 0 | Status: DISCONTINUED | OUTPATIENT
Start: 2021-10-05 | End: 2021-10-05

## 2021-10-05 RX ORDER — MECLIZINE HCL 12.5 MG
50 TABLET ORAL ONCE
Refills: 0 | Status: COMPLETED | OUTPATIENT
Start: 2021-10-05 | End: 2021-10-05

## 2021-10-05 RX ADMIN — Medication 10 MILLIGRAM(S): at 20:38

## 2021-10-05 RX ADMIN — Medication 50 MILLIGRAM(S): at 20:37

## 2021-10-05 RX ADMIN — Medication 0.25 MILLIGRAM(S): at 20:37

## 2021-10-05 RX ADMIN — ONDANSETRON 4 MILLIGRAM(S): 8 TABLET, FILM COATED ORAL at 20:37

## 2021-10-05 RX ADMIN — SODIUM CHLORIDE 1000 MILLILITER(S): 9 INJECTION, SOLUTION INTRAVENOUS at 20:38

## 2021-10-05 NOTE — ED PROVIDER NOTE - ATTENDING CONTRIBUTION TO CARE
Myra: I performed a face to face bedside interview with patient regarding history of present illness, review of symptoms and past medical history. I completed an independent physical exam.  I have discussed patient's plan of care with resident.   I agree with note as stated above including HISTORY OF PRESENT ILLNESS, HIV, PAST MEDICAL/SURGICAL/FAMILY/SOCIAL HISTORY, ALLERGIES AND HOME MEDICATIONS, REVIEW OF SYSTEMS, PHYSICAL EXAM, MEDICAL DECISION MAKING and any PROGRESS NOTES during the time I functioned as the attending physician for this patient unless otherwise noted. My brief assessment is as follows: 66F h/o sinusitis p/w dizziness described as room spinning sensation worse with head movement x 2 days, one episode of NBNB emesis today. Deneis slurred speech, weakness, numbness, tingling.   Exam notable for right beating extinguishable horizontal nystagmus on L gaze. Otherwise benign neuro exam. Likely peripheral vertigo. Plan for labs, CTH, CTA, symptom control, reassess.

## 2021-10-05 NOTE — ED PROVIDER NOTE - CLINICAL SUMMARY MEDICAL DECISION MAKING FREE TEXT BOX
65yo female with history of chronic sinusitis presenting with dizziness x 2 days with vomiting with out falls, trauma, fevers, chills. Left gaze evoked nystagmus and dizziness with positional head movement on examination. Concerning for new onset vertigo. will order labs and imaging and medications. Will reassess.

## 2021-10-05 NOTE — ED ADULT NURSE NOTE - NSIMPLEMENTINTERV_GEN_ALL_ED
Implemented All Fall Risk Interventions:  [place] to call system. Call bell, personal items and telephone within reach. Instruct patient to call for assistance. Room bathroom lighting operational. Non-slip footwear when patient is off stretcher. Physically safe environment: no spills, clutter or unnecessary equipment. Stretcher in lowest position, wheels locked, appropriate side rails in place. Provide visual cue, wrist band, yellow gown, etc. Monitor gait and stability. Monitor for mental status changes and reorient to person, place, and time. Review medications for side effects contributing to fall risk. Reinforce activity limits and safety measures with patient and family.

## 2021-10-05 NOTE — ED PROVIDER NOTE - NSFOLLOWUPINSTRUCTIONS_ED_ALL_ED_FT
- Follow up with your primary care doctor within 1-3 days.   - Consider seeing a neurologist, see information above.   - Bring results with you to the appointment.   - Take Meclizine 25mg every 6-8 hours as needed for dizziness.   - Return to the Emergency Department for new or worsening symptoms.     Dizziness    Dizziness can manifest as a feeling of unsteadiness or light-headedness. You may feel like you are about to faint. This condition can be caused by a number of things, including medicines, dehydration, or illness. Drink enough fluid to keep your urine clear or pale yellow. Do not drink alcohol and limit your caffeine intake. Avoid quick or sudden movements.  Rise slowly from chairs and steady yourself until you feel okay. In the morning, first sit up on the side of the bed.    SEEK IMMEDIATE MEDICAL CARE IF YOU HAVE ANY OF THE FOLLOWING SYMPTOMS: vomiting, changes in your vision or speech, weakness in your arms or legs, trouble speaking or swallowing, chest pain, abdominal pain, shortness of breath, sweating, bleeding, headache, neck pain, or fever.

## 2021-10-05 NOTE — ED PROVIDER NOTE - OBJECTIVE STATEMENT
65yo female with history of chronic sinusitis presenting with dizziness x 2 days with vomiting. Patient states that she becomes dizzy with head movement which caused her to be nausea and vomit 1 time. Patient denies history of vertigo and denies having these symptoms in the past. Denies falls, headache, fevers, chills, chest pain, shortness of breath, cough, urinary symptoms, focal neurologic symptoms. Patient is following PCP and supposed to see ENT for recurrent sinus infection.

## 2021-10-05 NOTE — ED ADULT TRIAGE NOTE - CHIEF COMPLAINT QUOTE
pt BIBA c/o sinus infection, nasal congestion, nausea, lightheadedness. pt reports feels better when sitting. had similar sx last week and has f/u appt in 2 weeks. ambulatory. sensation intact, no numbness/tinlging. ANAYA X 4

## 2021-10-05 NOTE — ED PROVIDER NOTE - PATIENT PORTAL LINK FT
You can access the FollowMyHealth Patient Portal offered by [facility] by registering at the following website: [email or web address]. By joining [employer]’s FollowMyHealth portal, you will also be able to view your health information using other applications (apps) compatible with our system.

## 2021-10-05 NOTE — ED PROVIDER NOTE - CARE PROVIDERS DIRECT ADDRESSES
LOV 2/19/2021    LAST LAB 12/21/2020    LAST RX   lisinopril 5 MG Oral Tab 90 tablet 0 1/19/2021         Next OV   Future Appointments   Date Time Provider Geoff Zamora   6/15/2021 11:40 AM Milady Norris MD [facility]   9/30/2021  1:00 PM Und
[email or web address]

## 2021-10-05 NOTE — ED PROVIDER NOTE - PHYSICAL EXAMINATION
General: Well appearing in no acute distress. Alert and cooperative.   Head: Normocephalic, atraumatic.  Eyes: PERRLA. No conjunctival injection. No scleral icterus. EOMI  ENMT: Atraumatic external nose and ears.  Neck: Soft and supple. Full ROM without pain. No midline tenderness.  Cardiac: Regular rate and regular rhythm. No murmurs, rubs, gallops. No LE edema.  Resp: Unlabored respiratory effort. Lungs CTAB. Speaking in full sentences. No wheezes, rales or rhonchi.  Abd: Soft, non-tender, non-distended. No guarding or rebound. No scars, masses, or lesions.  MSK: Spine midline and non-tender. No CVA tenderness.    Skin: Warm and dry. No rashes, abrasions, or lacerations.  Neuro: Left gaze evoked nystagmus. AO x 3. Moves all extremities symmetrically. Motor strength and sensation grossly intact.

## 2021-10-05 NOTE — ED PROVIDER NOTE - PROGRESS NOTE DETAILS
On reassessment patient states symptoms improved. Pending imaging. -Yanet,  Myra COPELAND: symptoms resolved, imaging negative. Will follow with neurology. Return precautions given.

## 2021-10-05 NOTE — ED PROVIDER NOTE - CARE PROVIDER_API CALL
Allan Hallman; PhD)  Neurology; Vascular Neurology  [address] [facility] [facility] [address]  [place] [address]  Phone: [phone]  Fax: [phone]  Follow Up Time: 4-6 Days

## 2021-10-06 VITALS
HEART RATE: 61 BPM | TEMPERATURE: 98 F | SYSTOLIC BLOOD PRESSURE: 112 MMHG | RESPIRATION RATE: 18 BRPM | OXYGEN SATURATION: 100 % | DIASTOLIC BLOOD PRESSURE: 73 MMHG

## 2021-10-06 PROCEDURE — 36415 COLL VENOUS BLD VENIPUNCTURE: CPT

## 2021-10-06 PROCEDURE — 96374 THER/PROPH/DIAG INJ IV PUSH: CPT | Mod: XU

## 2021-10-06 PROCEDURE — 99284 EMERGENCY DEPT VISIT MOD MDM: CPT | Mod: 25

## 2021-10-06 PROCEDURE — 93005 ELECTROCARDIOGRAM TRACING: CPT

## 2021-10-06 PROCEDURE — 85025 COMPLETE CBC W/AUTO DIFF WBC: CPT

## 2021-10-06 PROCEDURE — 80053 COMPREHEN METABOLIC PANEL: CPT

## 2021-10-06 PROCEDURE — 70498 CT ANGIOGRAPHY NECK: CPT | Mod: 26,MA

## 2021-10-06 PROCEDURE — 70498 CT ANGIOGRAPHY NECK: CPT | Mod: MA

## 2021-10-06 PROCEDURE — 70496 CT ANGIOGRAPHY HEAD: CPT | Mod: 26,MA

## 2021-10-06 PROCEDURE — 70496 CT ANGIOGRAPHY HEAD: CPT | Mod: MA

## 2021-10-06 PROCEDURE — 70450 CT HEAD/BRAIN W/O DYE: CPT | Mod: MA

## 2021-10-06 PROCEDURE — 96375 TX/PRO/DX INJ NEW DRUG ADDON: CPT

## 2021-10-06 RX ORDER — MECLIZINE HCL 12.5 MG
1 TABLET ORAL
Qty: 9 | Refills: 0
Start: 2021-10-06 | End: 2021-10-08

## 2021-10-06 NOTE — ED ADULT NURSE REASSESSMENT NOTE - NS ED NURSE REASSESS COMMENT FT1
Report received from off-going RN at 23:30, VSS, pt resting comfortably in stretcher. No s/s of apparent distress noted. Will continue to monitor at this time.

## 2022-04-27 NOTE — ED STATDOCS - MUSCULOSKELETAL, MLM
[facility] MEDICINE PROGRESS NOTE    Patient: Dayton Hart Today's Date: 4/27/2022   YOB: 1969 Admission Date: 4/17/2022  2:44 PM   MRN: [id number] Inpatient LOS: 10 day(s)   Room:  [facility] Hospital Day:  Hospital Day: 11       History and Subjective complaints       Interval history and Overnight events:  None    Patient seen and examined by me in follow up.    Hospital Course:  Patients interval history reviewed/EHR notes reviewed.   Dayton Hart is a 53 year old male who presented on 4/17/2022 with complaints of weakness.     Subjective:  Desean is having a good day. He feels that his stomach is \"ten times better than it was\".  Feels stronger, was actually able to sit up at the edge of the bed with therapy today. Slept well last night.  Appetite is \"almost too good\".  Denies any chest pain or SOB today.  States he has had four loose stools today.  Several with accidents but he is getting a little more of a warning before his urge to defecate today.      He is getting good relief with the prn Norco for his back and knee pain; it takes his pain down from a 7/10 in intensity to 3-4/10 in intensity which is tolerable for him. He was on prn Tramadol and Tylenol at home; did not think the Tramadol worked much better than the Tylenol.  Understands that he cannot go home on narcotics, but states that he is only in need of them here b/c the bed isn't so comfortable compared to home.           Reviewed Pertinent Histories: Medical History, Surgical History, Social History, Family History.     ROS: Pertinent systems negative except as above.    Medications: Reviewed     Scheduled Medications:    warfarin, 12.5 mg, Once  polyethylene glycol, 17 g, Daily  sodium chloride (PF), 10 mL, 2 times per day  amoxicillin-clavulanate, 1 tablet, Q8H  nystatin, , 2 times per day  sodium chloride (PF), 2 mL, 2 times per day  dilTIAZem, 240 mg, Daily  escitalopram, 10 mg, Daily  Potassium  Standard Replacement Protocol, , See Admin Instructions  Magnesium Standard Replacement Protocol, , See Admin Instructions  Phosphorus Standard Replacement Protocol, , See Admin Instructions  WARFARIN - PHARMACIST MONITORED, , See Admin Instructions      Continuous Infusions:    PRN Medications:  hydroCORTisone, docusate sodium-sennosides, sodium chloride (PF), LORazepam, HYDROcodone-acetaminophen, sodium chloride, cyclobenzaprine, meclizine, ondansetron, sodium chloride, acetaminophen      Physical Examination       Vital 24 Hour Range Most Recent Value   Temperature Temp  Min: 97.5 °F (36.4 °C)  Max: 98.5 °F (36.9 °C) 98.5 °F (36.9 °C)   Pulse Pulse  Min: 61  Max: 76 70   Respiratory Resp  Min: 16  Max: 24 16   Blood Pressure BP  Min: 119/72  Max: 135/61 123/58   Pulse Oximetry SpO2  Min: 97 %  Max: 100 % 98 % on 5 LPM CPAP     Intake and Output:      Intake/Output Summary (Last 24 hours) at 4/26/2022 1030  Last data filed at 4/26/2022 1008  Gross per 24 hour   Intake 700 ml po   Output 1525 m/stool x 2   Net -3475 ml       Last Stool Occurrence:  1 (04/27/22 1305)    Vital Most Recent Value First Value   Weight (!) 301.1 kg (663 lb 12.9 oz)-down 0.5 kg from yesterday Weight: (!) 291.8 kg (643 lb 4.8 oz)   Height 5' 9\" (175.3 cm) Height: 5' 9\" (175.3 cm)   BMI 98.03 N/A     General-pleasant, NAD. Wearing CPAP and lying mostly on his left side again. Alert and conversant.  Respirations unlabored.  Lungs-scattered soft rhonchi noted only in the right posterior lung field (he was lying, as above on his left side therefore unable to listen to that side posteriorly).  Anteriorly, he has clear lung fields without wheezing, crackles, rhonchi or rubs .    Heart-regular rate rhythm without murmurs, gallops, or rubs. Heart sounds somewhat distant.  Some occasional isolated ectopic beats.  Abdomen-obese with a very large pannus.  Blanching erythema noted in both lower abdominal areas, left > right side. Lighter pink today.   + orange peel consistency to lower abdominal skin along with dryness and cobblestoning type texture. Unable to fully evaluate abdominal folds/left abdominal area given current positioning.   Extremities-nonpitting diffuse edema BLE noted.  Difficult to grade given his obesity and the nonpitting nature.      Test Results     Labs: The Laboratory values listed below have been reviewed and pertinent findings discussed in the Assessment and Plan.    Recent Labs   Lab 04/27/22 0533 04/26/22  0553 04/25/22  0540 04/24/22  1332 04/24/22  0610 04/23/22  0651 04/23/22  0605 04/22/22  0617 04/21/22  0643   SODIUM 140  --   --   --   --   --  137 140 140   POTASSIUM 4.3  --   --   --   --   --  4.2 4.3 3.7   CHLORIDE 103  --   --   --   --   --  103 103 104   CO2 33*  --   --   --   --   --  28 34* 34*   BUN 15  --   --   --   --   --  12 11 10   CREATININE 0.56*  --   --   --   --   --  0.50* 0.51* 0.56*   GLUCOSE 107*  --   --   --   --   --  91 94 97   CALCIUM 8.3*  --   --   --   --   --  8.4 8.5 8.2*   ANIONGAP 8*  --   --   --   --   --  10 7* 6*   MG  --   --   --   --  1.9  --  2.0 2.1 2.1   PHOS  --   --   --   --  3.7  --  3.3 3.4 2.7   ALBUMIN  --   --   --   --   --   --  2.2* 2.2* 2.1*   AST  --   --   --   --   --   --  38* 38* 42*   GPT  --   --   --   --   --   --  32 35 36   BILIRUBIN  --   --   --   --   --   --  0.5 0.6 0.7   INR 1.4 1.5 1.5   < >  --    < >  --  2.4 2.8    < > = values in this interval not displayed.       Recent Labs   Lab 04/27/22 0533 04/23/22  0605 04/22/22  0617   WBC 6.7 8.9 8.1   HGB 12.8* 14.1 13.4   HCT 40.4 43.5 41.9    285 319   72% neutrophils  14% lymphocytes    Radiology Results (Imaging studies have been reviewed and pertinent findings discussed in the Assessment/Plan).:   No new data.     Micro/ID:  Nothing pending.       Tubes, Devices, Monitoring     Telemetry: Off  Reynolds in Place: Yes, placed 4/17/22-unable to remove at this time as described above under  subjective section.  External catheter will not work in this patient per RN.    Central Line: No    Assessment and Plan     Sepsis/panniculitis with malodorous wound present on admission  -lactic acid was 2.8 upon presentation and normalized upon follow up testing. Afebrile here.  WBC within normal limits (11.5K on admision which is his peak thus far).   -hayes catheter placed 4/17/22 due to patient's extreme morbid obesity and unable to use urinal unless sitting up at the edge of the bed (too weak for this now).  -continue antibiotics per ID recommendations.  Was on IV Daptomycin (4/20 x one), IV Ceftriaxone (4/20 and 4/22), IV Cefepime (4/17-4/20 AM), IV Flagyl (4/17-4/22AM followed by oral Flagyl until 4/222 noon) and IV Vancomycin (4/17 through 4/20 AM)  -now on Augmentin x 7 days per ID since 4/22.   -admission blood cultures negative/final.   -wound culture grew moderate Proteus, mirabilis, rare coag negative Staph and many diphtheroids  -Continue wound care per RN note yesterday:  Wash pannus/abdominal wound with soap/water and apply barrier cream 1x/daily or more PRN.  If draining, can attempt to apply ABD to site. Re-Consult wound care if concerns.      Paroxysmal atrial fibrillation on Warfarin  -continue Diltiazem  -continue Warfarin; pharmacy monitoring and dosing.  Still sub-therapeutic. Dose to be increased.     Benign essential hypertension   -patient reportedly non-adherent to medications at home; will discuss further with him tomorrow (per his admit MAR, he is on Diltiazem  mg daily and Furosemide 40 mg BID)  -BP fairly well controlled at this time      Anxiety/depression  -continue on Lexapro and p.r.n. Ativan     Chronic pain  -continue p.r.n. pain medications-advised he will not be discharging to home on narcotics so should try to wean off.  -will think about alternative options since on Warfarin and therefore NSAID's are not a good option and did not get any significant benefit with  Tramadol (compared to Tylenol at home)  -will stop prn IV Morphine (no administrations since 4/19 anyway)    MALA  -continue CPAP; patient wears it fairly continuously here and at home.     Loose stool  -monitor for now.Per night nurse, he had one stool last night which was formed, medium in size and soft and was able to get on the bedpan in time.   -will d/w Steffany, his day RN when she is available.   -check C diff if worsens and hold on prn anti-diarrheal agents at this time     Morbid obesity with BMI of 98.07  -discussed with patient that he would benefit from a referral to the [facility] after discharge.   -per chart review, he was invited to an informational seminar in 2015 but it is unclear if he attended. He doesn't really seem to remember this when I brought it up today.            Consults:    PHARMACY TO DOSE AND MONITOR WARFARIN  PHARMACY TO DOSE AND MONITOR VANCOMYCIN  IP CONSULT TO RN WOUND CARE  IP CONSULT TO NUTRITION SERVICES  IP CONSULT TO INFECTIOUS DISEASES  IP CONSULT TO UROLOGY  IP CONSULT TO RN WOUND CARE  IP CONSULT TO NUTRITION SERVICES    Diet:  Regular Diet  Therapy Orders:    PT and OT Orders Placed this Encounter   Procedures   • Occupational Therapy   • Occupational Therapy   • Physical Therapy   • Physical Therapy       Smoking status- former smoker    Nutrition status- appropriate  Body mass index is 98.03 kg/m². - Morbid obesity (BMI >40 or 35+ and a comorbid condition)  DVT Prophylaxis - Coumadin  Limited English proficiency with :  No         Advanced Directives     Code Status: Full Resuscitation           Discharge Plan     The patients treatment plans were discussed with the patient, the , his nurse (Steffany) and Dr. Riggins who was on nights.      Recommendations for Discharge    Sub-acute nursing home   PT Post acute therapy   OT Post acute therapy     Anticipated discharge destination: [facility]; social work also going to reach out to LTAC's now  due to multiple [facility] refusals.            Barriers to Discharge: Working on [facility] placement.           Sharlene Hendrickson MD  Hospitalist  4/27/2022  4:40 PM     ADDENDUM:  Per day RN (Steffany):  It was reported as medium bowel movements to me, they were soft but not diarrhea.      (Contact by secure chat)    tenderness to lower T and upper L spine

## 2022-06-29 ENCOUNTER — OFFICE (OUTPATIENT)
Dept: URBAN - METROPOLITAN AREA CLINIC 94 | Facility: CLINIC | Age: 67
Setting detail: OPHTHALMOLOGY
End: 2022-06-29
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
## 2022-06-29 PROCEDURE — NO SHOW FE NO SHOW FEE: Performed by: OPHTHALMOLOGY

## 2022-08-13 ENCOUNTER — EMERGENCY (EMERGENCY)
Facility: HOSPITAL | Age: 67
LOS: 1 days | Discharge: HOME | End: 2022-08-13
Attending: STUDENT IN AN ORGANIZED HEALTH CARE EDUCATION/TRAINING PROGRAM

## 2022-08-13 VITALS
HEART RATE: 107 BPM | TEMPERATURE: 99 F | RESPIRATION RATE: 20 BRPM | DIASTOLIC BLOOD PRESSURE: 76 MMHG | HEIGHT: 64 IN | SYSTOLIC BLOOD PRESSURE: 155 MMHG | WEIGHT: 145.06 LBS | OXYGEN SATURATION: 96 %

## 2022-08-13 DIAGNOSIS — Z90.710 ACQUIRED ABSENCE OF BOTH CERVIX AND UTERUS: Chronic | ICD-10-CM

## 2022-08-13 PROCEDURE — 10060 I&D ABSCESS SIMPLE/SINGLE: CPT

## 2022-08-13 PROCEDURE — 99283 EMERGENCY DEPT VISIT LOW MDM: CPT | Mod: FS,25

## 2022-08-13 RX ORDER — KETOROLAC TROMETHAMINE 30 MG/ML
30 SYRINGE (ML) INJECTION ONCE
Refills: 0 | Status: DISCONTINUED | OUTPATIENT
Start: 2022-08-13 | End: 2022-08-13

## 2022-08-13 RX ORDER — IBUPROFEN 200 MG
600 TABLET ORAL ONCE
Refills: 0 | Status: COMPLETED | OUTPATIENT
Start: 2022-08-13 | End: 2022-08-13

## 2022-08-13 RX ADMIN — Medication 300 MILLIGRAM(S): at 04:30

## 2022-08-13 RX ADMIN — Medication 600 MILLIGRAM(S): at 04:47

## 2022-08-13 NOTE — ED PROVIDER NOTE - PHYSICAL EXAMINATION
General-alert and oriented to person place and time, nontoxic appearing, pleasant cooperative, NAD  HEENT-normocephalic, atraumatic, NT to palp, EOMI, PERRLA, no conjunctival injections,   Cardio-s1,s2 present, regular rate and rhythm  Resp- talks in full sentences, symmetrical chest rise, CTA bilat, no evidence of wheezes, rhonchi noted  Abdomen- bowel sounds presnt in all 4 quadrants, soft, NT/ND, no guarding, no rebound tenderness  Skin- area of fluctuance with surrounding erythema under the right axilla, no active discharge, warm to touch, second area of induration appreciated just inferior to fluctuance  Back- nt to palp of cervical, thoracic, lumbar spine, nt to palp of paraspinal m., No CVA tenderness  Neuro- no focal deficits, sensation intact

## 2022-08-13 NOTE — ED PROVIDER NOTE - OBJECTIVE STATEMENT
66 y/o female with no sign medical history presents to the ED c/o abscess to the right axilla. Notes last week she scratched off a skin tag under her right arm. 4 days ago began to have redness and swelling to the axilla worsening today. Did not take any meds for the pain. Admits to subjective fevers at home. Denies nausea, vomiting, discharge.

## 2022-08-13 NOTE — ED PROVIDER NOTE - PATIENT PORTAL LINK FT
You can access the FollowMyHealth Patient Portal offered by [facility] by registering at the following website: [email or web address]. By joining [facility]’s FollowMyHealth portal, you will also be able to view your health information using other applications (apps) compatible with our system.

## 2022-08-13 NOTE — ED ADULT TRIAGE NOTE - CHIEF COMPLAINT QUOTE
C/O underarm swelling and pain for the past few days. Area is red, hot and tender to the touch.  Pt states a week ago she scratched a skin tag off and then the mass developed. +fever and chills at home.

## 2022-08-13 NOTE — ED PROVIDER NOTE - NS ED ATTENDING STATEMENT MOD
This was a shared visit with the MARIANNA. I reviewed and verified the documentation and independently performed the documented:

## 2022-08-13 NOTE — ED PROVIDER NOTE - ATTENDING APP SHARED VISIT CONTRIBUTION OF CARE
Mehrdad COPELAND: I have personally seen and examined this patient. I have fully participated in the care of this patient. I have made amendments to the documentation where appropriate and otherwise agree with the history, physical exam, and plan as documented by the ACP. My brief assessment is as follows:    67y F presenting for abscess to R axilla. I & D performed in the ED. Also with area of induration inferior to axilla; however no drainable fluid collection seen on POCUS. Will start on clindamycin.

## 2022-08-13 NOTE — ED PROVIDER NOTE - CLINICAL SUMMARY MEDICAL DECISION MAKING FREE TEXT BOX
abscess to right axilla, bedside I&D performed with drainage of purulent discharge, inferior area of induration explored with POCUS showing no fluid collection

## 2022-08-14 ENCOUNTER — INPATIENT (INPATIENT)
Facility: HOSPITAL | Age: 67
LOS: 7 days | Discharge: HOME | DRG: 603 | End: 2022-08-22
Attending: INTERNAL MEDICINE | Admitting: INTERNAL MEDICINE
Payer: MEDICARE

## 2022-08-14 VITALS
OXYGEN SATURATION: 98 % | DIASTOLIC BLOOD PRESSURE: 82 MMHG | RESPIRATION RATE: 16 BRPM | HEART RATE: 93 BPM | HEIGHT: 64 IN | SYSTOLIC BLOOD PRESSURE: 148 MMHG | WEIGHT: 169.98 LBS | TEMPERATURE: 98 F

## 2022-08-14 DIAGNOSIS — Z90.710 ACQUIRED ABSENCE OF BOTH CERVIX AND UTERUS: Chronic | ICD-10-CM

## 2022-08-14 LAB
ALBUMIN SERPL ELPH-MCNC: 3.9 G/DL — SIGNIFICANT CHANGE UP (ref 3.3–5.2)
ALP SERPL-CCNC: 73 U/L — SIGNIFICANT CHANGE UP (ref 40–120)
ALT FLD-CCNC: 24 U/L — SIGNIFICANT CHANGE UP
ANION GAP SERPL CALC-SCNC: 10 MMOL/L — SIGNIFICANT CHANGE UP (ref 5–17)
APTT BLD: 28 SEC — SIGNIFICANT CHANGE UP (ref 27.5–35.5)
AST SERPL-CCNC: 23 U/L — SIGNIFICANT CHANGE UP
BASOPHILS # BLD AUTO: 0.01 K/UL — SIGNIFICANT CHANGE UP (ref 0–0.2)
BASOPHILS NFR BLD AUTO: 0.1 % — SIGNIFICANT CHANGE UP (ref 0–2)
BILIRUB SERPL-MCNC: <0.2 MG/DL — LOW (ref 0.4–2)
BUN SERPL-MCNC: 10.8 MG/DL — SIGNIFICANT CHANGE UP (ref 8–20)
CALCIUM SERPL-MCNC: 9.4 MG/DL — SIGNIFICANT CHANGE UP (ref 8.4–10.5)
CHLORIDE SERPL-SCNC: 103 MMOL/L — SIGNIFICANT CHANGE UP (ref 98–107)
CO2 SERPL-SCNC: 27 MMOL/L — SIGNIFICANT CHANGE UP (ref 22–29)
CREAT SERPL-MCNC: 0.76 MG/DL — SIGNIFICANT CHANGE UP (ref 0.5–1.3)
EGFR: 87 ML/MIN/1.73M2 — SIGNIFICANT CHANGE UP
EOSINOPHIL # BLD AUTO: 0.32 K/UL — SIGNIFICANT CHANGE UP (ref 0–0.5)
EOSINOPHIL NFR BLD AUTO: 4 % — SIGNIFICANT CHANGE UP (ref 0–6)
GLUCOSE SERPL-MCNC: 116 MG/DL — HIGH (ref 70–99)
HCT VFR BLD CALC: 34.6 % — SIGNIFICANT CHANGE UP (ref 34.5–45)
HGB BLD-MCNC: 10.9 G/DL — LOW (ref 11.5–15.5)
IMM GRANULOCYTES NFR BLD AUTO: 0.3 % — SIGNIFICANT CHANGE UP (ref 0–1.5)
INR BLD: 1.13 RATIO — SIGNIFICANT CHANGE UP (ref 0.88–1.16)
LYMPHOCYTES # BLD AUTO: 1.62 K/UL — SIGNIFICANT CHANGE UP (ref 1–3.3)
LYMPHOCYTES # BLD AUTO: 20.4 % — SIGNIFICANT CHANGE UP (ref 13–44)
MCHC RBC-ENTMCNC: 27 PG — SIGNIFICANT CHANGE UP (ref 27–34)
MCHC RBC-ENTMCNC: 31.5 GM/DL — LOW (ref 32–36)
MCV RBC AUTO: 85.9 FL — SIGNIFICANT CHANGE UP (ref 80–100)
MONOCYTES # BLD AUTO: 0.77 K/UL — SIGNIFICANT CHANGE UP (ref 0–0.9)
MONOCYTES NFR BLD AUTO: 9.7 % — SIGNIFICANT CHANGE UP (ref 2–14)
NEUTROPHILS # BLD AUTO: 5.22 K/UL — SIGNIFICANT CHANGE UP (ref 1.8–7.4)
NEUTROPHILS NFR BLD AUTO: 65.5 % — SIGNIFICANT CHANGE UP (ref 43–77)
PLATELET # BLD AUTO: 258 K/UL — SIGNIFICANT CHANGE UP (ref 150–400)
POTASSIUM SERPL-MCNC: 3.8 MMOL/L — SIGNIFICANT CHANGE UP (ref 3.5–5.3)
POTASSIUM SERPL-SCNC: 3.8 MMOL/L — SIGNIFICANT CHANGE UP (ref 3.5–5.3)
PROT SERPL-MCNC: 6.8 G/DL — SIGNIFICANT CHANGE UP (ref 6.6–8.7)
PROTHROM AB SERPL-ACNC: 13.1 SEC — SIGNIFICANT CHANGE UP (ref 10.5–13.4)
RBC # BLD: 4.03 M/UL — SIGNIFICANT CHANGE UP (ref 3.8–5.2)
RBC # FLD: 12.9 % — SIGNIFICANT CHANGE UP (ref 10.3–14.5)
SODIUM SERPL-SCNC: 140 MMOL/L — SIGNIFICANT CHANGE UP (ref 135–145)
WBC # BLD: 7.96 K/UL — SIGNIFICANT CHANGE UP (ref 3.8–10.5)
WBC # FLD AUTO: 7.96 K/UL — SIGNIFICANT CHANGE UP (ref 3.8–10.5)

## 2022-08-14 PROCEDURE — 99285 EMERGENCY DEPT VISIT HI MDM: CPT | Mod: FS

## 2022-08-14 RX ORDER — MORPHINE SULFATE 50 MG/1
4 CAPSULE, EXTENDED RELEASE ORAL ONCE
Refills: 0 | Status: DISCONTINUED | OUTPATIENT
Start: 2022-08-14 | End: 2022-08-14

## 2022-08-14 RX ORDER — VANCOMYCIN HCL 1 G
1000 VIAL (EA) INTRAVENOUS ONCE
Refills: 0 | Status: COMPLETED | OUTPATIENT
Start: 2022-08-14 | End: 2022-08-14

## 2022-08-14 RX ADMIN — MORPHINE SULFATE 4 MILLIGRAM(S): 50 CAPSULE, EXTENDED RELEASE ORAL at 21:32

## 2022-08-14 RX ADMIN — Medication 250 MILLIGRAM(S): at 21:30

## 2022-08-14 RX ADMIN — MORPHINE SULFATE 4 MILLIGRAM(S): 50 CAPSULE, EXTENDED RELEASE ORAL at 21:45

## 2022-08-14 RX ADMIN — Medication 1000 MILLIGRAM(S): at 22:30

## 2022-08-14 NOTE — ED STATDOCS - CLINICAL SUMMARY MEDICAL DECISION MAKING FREE TEXT BOX
Pt with worsening abscess despite drainage and PO antibiotics. Will obtain CT to evaluate extent of abscess. Will need drainage and IV antibiotics.

## 2022-08-14 NOTE — ED STATDOCS - OBJECTIVE STATEMENT
68 y/o female with no PMHx presents with large draining abscess on her right side. Pt states it has gotten bigger, more red and more painful than it was a few days ago. Pt here to drain abscess and relieve pain.

## 2022-08-14 NOTE — ED STATDOCS - ATTENDING APP SHARED VISIT CONTRIBUTION OF CARE
I, Flavio Thomson, performed the initial face to face bedside interview with this patient regarding history of present illness, review of symptoms and relevant past medical, social and family history.  I completed an independent physical examination.  I was the initial provider who evaluated this patient. I have signed out the follow up of any pending tests (i.e. labs, radiological studies) to the ACP.  I have communicated the patient’s plan of care and disposition with the ACP.

## 2022-08-14 NOTE — ED ADULT TRIAGE NOTE - CHIEF COMPLAINT QUOTE
Patient ambulated into ED c/o painful abscess to right axillary area. States she was here 2 days ago with the same complaint and that they drained it. Patient was sent home with Clindamycin which she reports compliance with. Afebrile in triage.

## 2022-08-14 NOTE — ED STATDOCS - PROGRESS NOTE DETAILS
COMPA Chao: Patient evaluated by intake physician. HPI/ROS/PE as noted above. Will follow up plan per intake physician and continue to assess patient. COMPA Chao: Continued pain, meds ordered. Pending CT.

## 2022-08-15 DIAGNOSIS — L03.90 CELLULITIS, UNSPECIFIED: ICD-10-CM

## 2022-08-15 LAB
LACTATE BLDV-MCNC: 1.9 MMOL/L — SIGNIFICANT CHANGE UP (ref 0.5–2)
RAPID RVP RESULT: SIGNIFICANT CHANGE UP
SARS-COV-2 RNA SPEC QL NAA+PROBE: SIGNIFICANT CHANGE UP

## 2022-08-15 PROCEDURE — 99218: CPT | Mod: FS

## 2022-08-15 PROCEDURE — 71260 CT THORAX DX C+: CPT | Mod: 26,MA

## 2022-08-15 PROCEDURE — 99223 1ST HOSP IP/OBS HIGH 75: CPT

## 2022-08-15 RX ORDER — HEPARIN SODIUM 5000 [USP'U]/ML
5000 INJECTION INTRAVENOUS; SUBCUTANEOUS EVERY 12 HOURS
Refills: 0 | Status: DISCONTINUED | OUTPATIENT
Start: 2022-08-15 | End: 2022-08-17

## 2022-08-15 RX ORDER — ALBUTEROL 90 UG/1
2 AEROSOL, METERED ORAL EVERY 6 HOURS
Refills: 0 | Status: DISCONTINUED | OUTPATIENT
Start: 2022-08-15 | End: 2022-08-17

## 2022-08-15 RX ORDER — CEFTRIAXONE 500 MG/1
2000 INJECTION, POWDER, FOR SOLUTION INTRAMUSCULAR; INTRAVENOUS EVERY 24 HOURS
Refills: 0 | Status: DISCONTINUED | OUTPATIENT
Start: 2022-08-15 | End: 2022-08-16

## 2022-08-15 RX ORDER — PANTOPRAZOLE SODIUM 20 MG/1
40 TABLET, DELAYED RELEASE ORAL
Refills: 0 | Status: DISCONTINUED | OUTPATIENT
Start: 2022-08-15 | End: 2022-08-17

## 2022-08-15 RX ORDER — ONDANSETRON 8 MG/1
4 TABLET, FILM COATED ORAL EVERY 8 HOURS
Refills: 0 | Status: DISCONTINUED | OUTPATIENT
Start: 2022-08-15 | End: 2022-08-17

## 2022-08-15 RX ORDER — VANCOMYCIN HCL 1 G
1250 VIAL (EA) INTRAVENOUS EVERY 12 HOURS
Refills: 0 | Status: DISCONTINUED | OUTPATIENT
Start: 2022-08-16 | End: 2022-08-17

## 2022-08-15 RX ORDER — ASPIRIN/CALCIUM CARB/MAGNESIUM 324 MG
81 TABLET ORAL DAILY
Refills: 0 | Status: DISCONTINUED | OUTPATIENT
Start: 2022-08-15 | End: 2022-08-17

## 2022-08-15 RX ORDER — MORPHINE SULFATE 50 MG/1
4 CAPSULE, EXTENDED RELEASE ORAL EVERY 6 HOURS
Refills: 0 | Status: DISCONTINUED | OUTPATIENT
Start: 2022-08-15 | End: 2022-08-17

## 2022-08-15 RX ORDER — LORATADINE 10 MG/1
10 TABLET ORAL DAILY
Refills: 0 | Status: DISCONTINUED | OUTPATIENT
Start: 2022-08-15 | End: 2022-08-17

## 2022-08-15 RX ORDER — KETOROLAC TROMETHAMINE 30 MG/ML
30 SYRINGE (ML) INJECTION ONCE
Refills: 0 | Status: DISCONTINUED | OUTPATIENT
Start: 2022-08-15 | End: 2022-08-15

## 2022-08-15 RX ORDER — ACETAMINOPHEN 500 MG
650 TABLET ORAL EVERY 6 HOURS
Refills: 0 | Status: DISCONTINUED | OUTPATIENT
Start: 2022-08-15 | End: 2022-08-17

## 2022-08-15 RX ORDER — VANCOMYCIN HCL 1 G
VIAL (EA) INTRAVENOUS
Refills: 0 | Status: DISCONTINUED | OUTPATIENT
Start: 2022-08-15 | End: 2022-08-17

## 2022-08-15 RX ORDER — ACETAMINOPHEN 500 MG
1000 TABLET ORAL ONCE
Refills: 0 | Status: COMPLETED | OUTPATIENT
Start: 2022-08-15 | End: 2022-08-15

## 2022-08-15 RX ORDER — MORPHINE SULFATE 50 MG/1
2 CAPSULE, EXTENDED RELEASE ORAL EVERY 4 HOURS
Refills: 0 | Status: DISCONTINUED | OUTPATIENT
Start: 2022-08-15 | End: 2022-08-17

## 2022-08-15 RX ORDER — HEPARIN SODIUM 5000 [USP'U]/ML
5000 INJECTION INTRAVENOUS; SUBCUTANEOUS EVERY 12 HOURS
Refills: 0 | Status: DISCONTINUED | OUTPATIENT
Start: 2022-08-15 | End: 2022-08-15

## 2022-08-15 RX ORDER — VANCOMYCIN HCL 1 G
1250 VIAL (EA) INTRAVENOUS ONCE
Refills: 0 | Status: COMPLETED | OUTPATIENT
Start: 2022-08-15 | End: 2022-08-15

## 2022-08-15 RX ORDER — ACETAMINOPHEN 500 MG
650 TABLET ORAL EVERY 6 HOURS
Refills: 0 | Status: DISCONTINUED | OUTPATIENT
Start: 2022-08-15 | End: 2022-08-15

## 2022-08-15 RX ORDER — LANOLIN ALCOHOL/MO/W.PET/CERES
3 CREAM (GRAM) TOPICAL AT BEDTIME
Refills: 0 | Status: DISCONTINUED | OUTPATIENT
Start: 2022-08-15 | End: 2022-08-17

## 2022-08-15 RX ORDER — KETOROLAC TROMETHAMINE 30 MG/ML
30 SYRINGE (ML) INJECTION EVERY 8 HOURS
Refills: 0 | Status: DISCONTINUED | OUTPATIENT
Start: 2022-08-15 | End: 2022-08-17

## 2022-08-15 RX ADMIN — Medication 1000 MILLIGRAM(S): at 01:30

## 2022-08-15 RX ADMIN — Medication 100 MILLIGRAM(S): at 03:13

## 2022-08-15 RX ADMIN — Medication 30 MILLIGRAM(S): at 03:25

## 2022-08-15 RX ADMIN — MORPHINE SULFATE 4 MILLIGRAM(S): 50 CAPSULE, EXTENDED RELEASE ORAL at 20:59

## 2022-08-15 RX ADMIN — Medication 100 MILLIGRAM(S): at 11:12

## 2022-08-15 RX ADMIN — Medication 400 MILLIGRAM(S): at 01:16

## 2022-08-15 RX ADMIN — CEFTRIAXONE 100 MILLIGRAM(S): 500 INJECTION, POWDER, FOR SOLUTION INTRAMUSCULAR; INTRAVENOUS at 13:53

## 2022-08-15 RX ADMIN — HEPARIN SODIUM 5000 UNIT(S): 5000 INJECTION INTRAVENOUS; SUBCUTANEOUS at 17:24

## 2022-08-15 RX ADMIN — Medication 600 MILLIGRAM(S): at 03:40

## 2022-08-15 RX ADMIN — Medication 166.67 MILLIGRAM(S): at 16:39

## 2022-08-15 RX ADMIN — Medication 650 MILLIGRAM(S): at 13:56

## 2022-08-15 RX ADMIN — Medication 30 MILLIGRAM(S): at 03:13

## 2022-08-15 NOTE — CONSULT NOTE ADULT - SUBJECTIVE AND OBJECTIVE BOX
Surgery Consult    Consulting attending:      HPI:  67 year old female who presents with complaints of R axillary pain and swelling, after scratching a bump on last week. patient has since had worsening pain and erythema. She endorses subjective fever/chills. Denies any N&V, CP, palpitations, SOB, breast lump/lesion, nipple d/c.  Patient presented prior for aimlar complaints, and was treated with a short course of abx. CT demonstrates a fluid collection with phlegmon changes that appears to be consistent with inflammation/cellulitis. Admitted to medicine. Surgery consulted for evaluation.       PAST MEDICAL HISTORY:    HTN (hypertension)          PAST SURGICAL HISTORY:  H/O: hysterectomy          MEDICATIONS:  acetaminophen     Tablet .. 650 milliGRAM(s) Oral every 6 hours PRN  ALBUTerol    90 MICROgram(s) HFA Inhaler 2 Puff(s) Inhalation every 6 hours PRN  aluminum hydroxide/magnesium hydroxide/simethicone Suspension 30 milliLiter(s) Oral every 4 hours PRN  aspirin enteric coated 81 milliGRAM(s) Oral daily  cefTRIAXone   IVPB 2000 milliGRAM(s) IV Intermittent every 24 hours  heparin SubCutaneous Injection - Peds 5000 Unit(s) SubCutaneous every 12 hours  ketorolac   Injectable 30 milliGRAM(s) IV Push every 8 hours PRN  loratadine 10 milliGRAM(s) Oral daily  melatonin 3 milliGRAM(s) Oral at bedtime PRN  morphine  - Injectable 2 milliGRAM(s) IV Push every 4 hours PRN  morphine  - Injectable 4 milliGRAM(s) IV Push every 6 hours PRN  ondansetron Injectable 4 milliGRAM(s) IV Push every 8 hours PRN  pantoprazole    Tablet 40 milliGRAM(s) Oral before breakfast  vancomycin  IVPB 1250 milliGRAM(s) IV Intermittent once  vancomycin  IVPB            ALLERGIES:  No Known Allergies        VITALS & I/Os:  Vital Signs Last 24 Hrs  T(C): 37.2 (15 Aug 2022 15:06), Max: 37.2 (15 Aug 2022 15:06)  T(F): 99 (15 Aug 2022 15:06), Max: 99 (15 Aug 2022 15:06)  HR: 69 (15 Aug 2022 15:06) (62 - 93)  BP: 105/65 (15 Aug 2022 15:06) (96/54 - 148/82)  BP(mean): 79 (15 Aug 2022 15:06) (79 - 79)  RR: 18 (15 Aug 2022 15:06) (16 - 18)  SpO2: 98% (15 Aug 2022 15:06) (96% - 98%)    Parameters below as of 15 Aug 2022 15:06  Patient On (Oxygen Delivery Method): room air        I&O's Summary        PHYSICAL EXAM:  Constitutional: patient resting comfortably in bed, in no acute distress, pain , uncomfortable  HEENT: EOMI / PERRL b/l, no active drainage or redness  Neck: No JVD, full ROM without pain  Respiratory: respirations are unlabored, no accessory muscle use, no conversational dyspnea  Cardiovascular: regular rate & rhythm  Neurological: GCS: 15 (4/5/6). A&O x 3; no gross sensory / motor / coordination deficits  Psychiatric: Normal mood, normal affect  Musculoskeletal: R axilla eythema that extends lateral and towards breast, tender to palpation, no definitive area of fluctuance, indurated           LABS:                        10.9   7.96  )-----------( 258      ( 14 Aug 2022 21:29 )             34.6     08-14    140  |  103  |  10.8  ----------------------------<  116<H>  3.8   |  27.0  |  0.76    Ca    9.4      14 Aug 2022 21:29    TPro  6.8  /  Alb  3.9  /  TBili  <0.2<L>  /  DBili  x   /  AST  23  /  ALT  24  /  AlkPhos  73  08-14    Lactate:  08-15 @ 12:10  1.90    PT/INR - ( 14 Aug 2022 21:29 )   PT: 13.1 sec;   INR: 1.13 ratio         PTT - ( 14 Aug 2022 21:29 )  PTT:28.0 sec                IMAGING:  < from: CT Chest w/ IV Cont (08.15.22 @ 01:39) >  PROCEDURE:  CT of the Chest was performed.  Sagittal and coronal reformats were performed.    FINDINGS:    LUNGS AND AIRWAYS: Patent central airways.  There are no confluent   airspace opacities. Mild dependent changes are seen posteriorly. Linear   scarring of the lingula is appreciated..  PLEURA: No pleural effusion.  MEDIASTINUM AND BUD: No lymphadenopathy.  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  HEART: Heart size is normal. No pericardial effusion.  CHEST WALL AND LOWER NECK: There is a 3.4 cm right thyroid nodule. Given   the size this may warrant histopathologic correlation. An additional 1 cm   left thyroid nodule is noted. These would be better evaluated with   nonemergent thyroid sonography.  VISUALIZED UPPER ABDOMEN: Within normal limits.  BONES: Within normal limits.    OTHER: Approximate 5 cm area of fat stranding and fluid is noted of the   right axilla extending along the right lateral upper chest. This is   amorphous in appearance and does not have well-defined walls. There is   associated skin thickening.      IMPRESSION:  Approximate 5 cm area of inflammatory stranding in focal fluid, which is   amorphous in morphology. There are no distinct margins or wall   enhancement appreciated. This is likely localized cellulitis versus   phlegmonous changes.    < end of copied text >

## 2022-08-15 NOTE — ED CDU PROVIDER DISPOSITION NOTE - CLINICAL COURSE
pt in obs on cellulitis protocol  despite po outpt antibiotic, then iv antibiotic tx in obs, cellulitis spreading to chest wall inferiorly and breast medially . Needs IV AB as inpatient.

## 2022-08-15 NOTE — H&P ADULT - HISTORY OF PRESENT ILLNESS
67 year old female with a h/o seasonal allergies presets from home with pain and swelling of the Rt axilla. She reports that 5-6 days ago she was scratching a freckle like lesion in the area and the next day had some pain at the site. The area became progressively more erythematous and painful and she reports fevers of 107 F at home with rigors. She states that she took some Tylenol and placed ice packs on her abd which broke her fevers. Denies prior similar problems in the area, denies recurrent cellulitis or bois. She does note that she was told by her PCP arya her WBC count was low and was seeing Dr Solano for it who ruled out leukemia/lymphoma.   No CP, SOB, abd pain, N/V, changes in GI/ habits

## 2022-08-15 NOTE — ED CDU PROVIDER INITIAL DAY NOTE - OBJECTIVE STATEMENT
68 yo female no PMHx presents to ED c/o right armpit pain. Patient evaluated in ED 2 days ago, had small collection drained under right axilla, placed on Clindamycin; continuing to drain. Now c/o additional large area of erythema and swelling. No further complaints at this time. 68 yo female no PMHx presents to ED c/o right armpit pain. Patient evaluated in ED 2 days ago after scratching off skin tag, had small collection drained under right axilla, placed on Clindamycin; continuing to drain. Now c/o additional large area of erythema and swelling. No further complaints at this time.

## 2022-08-15 NOTE — ED CDU PROVIDER INITIAL DAY NOTE - MEDICAL DECISION MAKING DETAILS
66 yo female no PMHx presents to ED with actively draining right axilla abscess and lower, large area of induration and cellulitis. CT negative for collection. Placed in CDU for IV abx, ID consult. Made aware of incidental thyroid finding.

## 2022-08-15 NOTE — ED CDU PROVIDER INITIAL DAY NOTE - PROGRESS NOTE DETAILS
pt was signed out to me at 7am, worsening cellulitis /phlegmonous changes on CT despite 2 days po clindamycin. pt reports fevers 100.7F at home, added blood cx and lactate. seen with Dr. Tolliver, will need admission for further IV ABX. spoke with ID Dr. Cuevas aware of case. d/w hospitalist Dr. Velasquez

## 2022-08-15 NOTE — ED CDU PROVIDER INITIAL DAY NOTE - ATTENDING APP SHARED VISIT CONTRIBUTION OF CARE
I, Flavio Thomson, performed a face to face bedside interview with this patient regarding history of present illness, review of symptoms and relevant past medical, social and family history.  I completed an independent physical examination. I have communicated the patient’s plan of care and disposition with the ACP.  67 year old female presents with failed oupt R axilla cellulitis, placed on obs for iv abx  Gen: NAD, well appearing  CV: RRR  Pul: CTA b/l  Abd: Soft, non-distended, non-tender  Neuro: no focal deficits  skin: induration, warmth, ttp tpo the R axilla

## 2022-08-15 NOTE — H&P ADULT - NSHPPHYSICALEXAM_GEN_ALL_CORE
Gen : Non toxic appearing, im mild distress 2/2 pain,   HEENT : NCAT, MMM, No cervical lymphadenopathy, no JVD  CVS : S1S2, regular rate and rhythm, no murmurs  Resp : CTA b/l, no rhonchi or wheezes appreciated   Abd : Soft, non distended, non tender, BS +ve   MSK : No joint swelling or tenderness, no digital clubbing, no distal cyanosis  Neuro : CN II-XII intact, no focal motor or sensory deficits   Psych : Pleasant, no anxiety, normal affect.  Skin : Rt axillar with signicant induration and 2 small punctum spaced out over anteromedial and posteromedial aspects of induration   Breast Exam (partner present during exam) w/o any erythema or dimpling, Left breast nodular/cystic lesion present in LLQ

## 2022-08-15 NOTE — ED CDU PROVIDER INITIAL DAY NOTE - PHYSICAL EXAMINATION
· CONSTITUTIONAL: well appearing and in no apparent distress.  · EYES: clear bilaterally.  Pupils equal, round, and reactive to light.  · ENMT: Nasal mucosa clear.  Mouth with normal mucosa  Throat has no vesicles, no oropharyngeal exudates and uvula is midline.  · CARDIAC: normal rate, regular rhythm, and no murmur.  · RESPIRATORY: breath sounds clear and equal bilaterally.  · GASTROINTESTINAL: abdomen soft, non-tender, and non-distended. Bowel sounds present.  · MUSCULOSKELETAL: range of motion is not limited and there is no muscle tenderness.  · NEUROLOGICAL: sensation is normal and strength is normal.  · SKIN: small area of fluctuance actively draining to right axilla. large area of induration, tenderness erythema below right axilla.

## 2022-08-15 NOTE — H&P ADULT - NSHPLABSRESULTS_GEN_ALL_CORE
8/15/22 CT chest w/ contrast   IMPRESSION:  Approximate 5 cm area of inflammatory stranding in focal fluid, which is   amorphous in morphology. There are no distinct margins or wall   enhancement appreciated. This is likely localized cellulitis versus   phlegmonous changes.    Additional findings and recommendations as above.

## 2022-08-15 NOTE — ED ADULT NURSE REASSESSMENT NOTE - NS ED NURSE REASSESS COMMENT FT1
Assumed care of the patient @0800. Pt A&Ox4, VSS afebrile. Right axilla redness and swelling noted. Awaiting ID consult. Patient in understanding of plan of care. Patient with no further questions for the RN. Resting in comfort. Call bell within reach and encouraged to use when assistance needed. Will continue to monitor.

## 2022-08-15 NOTE — H&P ADULT - ASSESSMENT
67 year old female with seasonal allergies presents with 6 days of fevers, rigors and worsening erythema and pain over Rt Axillary space with extension onto back    Admit to Medicine to any bed    Cellulitis w/ underlying abscess  Secondary to skin microtrauma by pt (unlikely hidradenitis, non apocrine and no prior history)    CT Chest noted,   f/u blood cultures (x2) till finalized although neg cultures may not be accurate as Abx were initiated 24 hours prior to obtaining   Surgery consulted for incision and drainage of firm collection palpable under indurated skin (requested to send specimen for culture to assist in organism ID)  dc Clindamycin,   Start Vancomycin and IV Ceftriaxone for now  Send MRSA PCR from nares  Send HbA1C  Morphine / Tylenol PRN for pain / fever    Seasonal Allergies  Loratadine  Albuterol Inh PRN     Reported Leucopenia  Per pt, hematological malignancy w/u was neg  If ja is low, would explain lack of elated WBC and current 7.9 would be relative leukocytosis    Outpt f/u with Dr Solano    Breast nodule  On palpation nodule present in LLQ (left breast) (partner present and examined at pts request)  Reports up to date with mammograms   PCP f/u reinforced      DVT ppx : Heparin s/c Q 12

## 2022-08-16 ENCOUNTER — TRANSCRIPTION ENCOUNTER (OUTPATIENT)
Age: 67
End: 2022-08-16

## 2022-08-16 LAB
A1C WITH ESTIMATED AVERAGE GLUCOSE RESULT: 5.7 % — HIGH (ref 4–5.6)
ANION GAP SERPL CALC-SCNC: 11 MMOL/L — SIGNIFICANT CHANGE UP (ref 5–17)
BASOPHILS # BLD AUTO: 0.02 K/UL — SIGNIFICANT CHANGE UP (ref 0–0.2)
BASOPHILS NFR BLD AUTO: 0.3 % — SIGNIFICANT CHANGE UP (ref 0–2)
BLD GP AB SCN SERPL QL: SIGNIFICANT CHANGE UP
BUN SERPL-MCNC: 14.7 MG/DL — SIGNIFICANT CHANGE UP (ref 8–20)
CALCIUM SERPL-MCNC: 9 MG/DL — SIGNIFICANT CHANGE UP (ref 8.4–10.5)
CHLORIDE SERPL-SCNC: 104 MMOL/L — SIGNIFICANT CHANGE UP (ref 98–107)
CO2 SERPL-SCNC: 28 MMOL/L — SIGNIFICANT CHANGE UP (ref 22–29)
CREAT SERPL-MCNC: 0.85 MG/DL — SIGNIFICANT CHANGE UP (ref 0.5–1.3)
EGFR: 76 ML/MIN/1.73M2 — SIGNIFICANT CHANGE UP
EOSINOPHIL # BLD AUTO: 0.4 K/UL — SIGNIFICANT CHANGE UP (ref 0–0.5)
EOSINOPHIL NFR BLD AUTO: 6.4 % — HIGH (ref 0–6)
ESTIMATED AVERAGE GLUCOSE: 117 MG/DL — HIGH (ref 68–114)
GLUCOSE SERPL-MCNC: 112 MG/DL — HIGH (ref 70–99)
HCT VFR BLD CALC: 32.8 % — LOW (ref 34.5–45)
HCV AB S/CO SERPL IA: 0.13 S/CO — SIGNIFICANT CHANGE UP (ref 0–0.99)
HCV AB SERPL-IMP: SIGNIFICANT CHANGE UP
HGB BLD-MCNC: 10.7 G/DL — LOW (ref 11.5–15.5)
IMM GRANULOCYTES NFR BLD AUTO: 0.3 % — SIGNIFICANT CHANGE UP (ref 0–1.5)
LYMPHOCYTES # BLD AUTO: 2.07 K/UL — SIGNIFICANT CHANGE UP (ref 1–3.3)
LYMPHOCYTES # BLD AUTO: 33.1 % — SIGNIFICANT CHANGE UP (ref 13–44)
MCHC RBC-ENTMCNC: 27.9 PG — SIGNIFICANT CHANGE UP (ref 27–34)
MCHC RBC-ENTMCNC: 32.6 GM/DL — SIGNIFICANT CHANGE UP (ref 32–36)
MCV RBC AUTO: 85.6 FL — SIGNIFICANT CHANGE UP (ref 80–100)
MONOCYTES # BLD AUTO: 0.52 K/UL — SIGNIFICANT CHANGE UP (ref 0–0.9)
MONOCYTES NFR BLD AUTO: 8.3 % — SIGNIFICANT CHANGE UP (ref 2–14)
MRSA PCR RESULT.: SIGNIFICANT CHANGE UP
NEUTROPHILS # BLD AUTO: 3.22 K/UL — SIGNIFICANT CHANGE UP (ref 1.8–7.4)
NEUTROPHILS NFR BLD AUTO: 51.6 % — SIGNIFICANT CHANGE UP (ref 43–77)
PLATELET # BLD AUTO: 278 K/UL — SIGNIFICANT CHANGE UP (ref 150–400)
POTASSIUM SERPL-MCNC: 4.6 MMOL/L — SIGNIFICANT CHANGE UP (ref 3.5–5.3)
POTASSIUM SERPL-SCNC: 4.6 MMOL/L — SIGNIFICANT CHANGE UP (ref 3.5–5.3)
RBC # BLD: 3.83 M/UL — SIGNIFICANT CHANGE UP (ref 3.8–5.2)
RBC # FLD: 12.9 % — SIGNIFICANT CHANGE UP (ref 10.3–14.5)
S AUREUS DNA NOSE QL NAA+PROBE: SIGNIFICANT CHANGE UP
SODIUM SERPL-SCNC: 142 MMOL/L — SIGNIFICANT CHANGE UP (ref 135–145)
WBC # BLD: 6.25 K/UL — SIGNIFICANT CHANGE UP (ref 3.8–10.5)
WBC # FLD AUTO: 6.25 K/UL — SIGNIFICANT CHANGE UP (ref 3.8–10.5)

## 2022-08-16 PROCEDURE — 99223 1ST HOSP IP/OBS HIGH 75: CPT

## 2022-08-16 PROCEDURE — 99233 SBSQ HOSP IP/OBS HIGH 50: CPT

## 2022-08-16 RX ORDER — SODIUM CHLORIDE 9 MG/ML
1000 INJECTION, SOLUTION INTRAVENOUS
Refills: 0 | Status: DISCONTINUED | OUTPATIENT
Start: 2022-08-17 | End: 2022-08-17

## 2022-08-16 RX ORDER — MEROPENEM 1 G/30ML
1000 INJECTION INTRAVENOUS EVERY 8 HOURS
Refills: 0 | Status: DISCONTINUED | OUTPATIENT
Start: 2022-08-16 | End: 2022-08-17

## 2022-08-16 RX ADMIN — Medication 166.67 MILLIGRAM(S): at 06:49

## 2022-08-16 RX ADMIN — MEROPENEM 100 MILLIGRAM(S): 1 INJECTION INTRAVENOUS at 21:25

## 2022-08-16 RX ADMIN — Medication 30 MILLIGRAM(S): at 02:42

## 2022-08-16 RX ADMIN — CEFTRIAXONE 100 MILLIGRAM(S): 500 INJECTION, POWDER, FOR SOLUTION INTRAMUSCULAR; INTRAVENOUS at 12:46

## 2022-08-16 RX ADMIN — LORATADINE 10 MILLIGRAM(S): 10 TABLET ORAL at 12:47

## 2022-08-16 RX ADMIN — Medication 166.67 MILLIGRAM(S): at 17:59

## 2022-08-16 RX ADMIN — HEPARIN SODIUM 5000 UNIT(S): 5000 INJECTION INTRAVENOUS; SUBCUTANEOUS at 17:59

## 2022-08-16 RX ADMIN — MORPHINE SULFATE 4 MILLIGRAM(S): 50 CAPSULE, EXTENDED RELEASE ORAL at 21:00

## 2022-08-16 RX ADMIN — Medication 30 MILLIGRAM(S): at 12:01

## 2022-08-16 RX ADMIN — MORPHINE SULFATE 4 MILLIGRAM(S): 50 CAPSULE, EXTENDED RELEASE ORAL at 02:42

## 2022-08-16 RX ADMIN — Medication 30 MILLIGRAM(S): at 01:03

## 2022-08-16 RX ADMIN — PANTOPRAZOLE SODIUM 40 MILLIGRAM(S): 20 TABLET, DELAYED RELEASE ORAL at 06:49

## 2022-08-16 RX ADMIN — Medication 81 MILLIGRAM(S): at 12:47

## 2022-08-16 RX ADMIN — Medication 30 MILLIGRAM(S): at 11:18

## 2022-08-16 RX ADMIN — Medication 3 MILLIGRAM(S): at 21:24

## 2022-08-16 RX ADMIN — MORPHINE SULFATE 4 MILLIGRAM(S): 50 CAPSULE, EXTENDED RELEASE ORAL at 20:23

## 2022-08-16 RX ADMIN — HEPARIN SODIUM 5000 UNIT(S): 5000 INJECTION INTRAVENOUS; SUBCUTANEOUS at 07:08

## 2022-08-16 NOTE — PROGRESS NOTE ADULT - ASSESSMENT
67 year old female with seasonal allergies presents with 6 days of fevers, rigors and worsening erythema and pain over Rt Axillary space with extension onto back      Cellulitis w/ underlying abscess  Secondary to skin microtrauma by pt (unlikely hidradenitis, non apocrine and no prior history)    CT Chest with approximate 5 cm area of fat stranding and fluid noted of the right axilla extending along the right lateral upper chest.  f/u blood cultures (x2) till finalized although neg cultures may not be accurate as Abx were initiated 24 hours prior to obtaining   Surgery consulted for incision and drainage of firm collection palpable under indurated skin (requested to send specimen for culture to assist in organism ID) - no I&D recommended at this time, surgery to f/u given patient c/o increased swelling   Continue Vancomycin and IV Ceftriaxone for now  MRSA PCR from nares negative  HbA1C 5.7  Morphine / Tylenol PRN for pain / fever    Seasonal Allergies  Loratadine  Albuterol Inh PRN     Reported Leucopenia  Per pt, hematological malignancy w/u was neg  If ja is low, would explain lack of elevated WBC and current 10.7 would be relative leukocytosis    Outpt f/u with Dr Solano    Breast nodule  On palpation nodule present in LLQ (left breast)   Reports up to date with mammograms   PCP f/u reinforced      DVT ppx : Heparin s/c Q 12

## 2022-08-16 NOTE — PROGRESS NOTE ADULT - NS ATTEND AMEND GEN_ALL_CORE FT
Pt seen and examined at bedside  Pt reports swelling and pain is worsening since yesterday     CONSTITUTIONAL: Awake, alert and in no apparent distress  ENMT: Airway patent, Nasal mucosa clear. Mouth with normal mucosa.   EYES: Clear bilaterally, pupils equal, round and reactive to light. EOMI.  CARDIAC: Normal rate, regular rhythm.  Heart sounds S1, S2.  No murmurs, rubs or gallops   RESPIRATORY: Breath sounds clear and equal bilaterally. No wheezes, rhales or rhonchi   ABDOMINAL: Nontender to palpation. No rebound/ guarding   MUSCULOSKELETAL: Spine appears normal, range of motion is not limited, no muscle or joint tenderness   EXTREMITIES: No edema, cyanosis or deformity   NEUROLOGICAL: Alert and oriented, no focal deficits, no motor or sensory deficits   SKIN: R axilla with swelling, pain and erythema, +GIRISH     Agree with A/P above   Cellulitis of R axilla r/o abscess  Reviewed imaging  Per pt, swelling and pain is worsening despite IV antibiotics  Will ask Surgery to re-evaluate   c/w IV antibiotics Pt seen and examined at bedside  Pt reports swelling and pain is worsening since yesterday     CONSTITUTIONAL: Awake, alert and in no apparent distress  ENMT: Airway patent, Nasal mucosa clear. Mouth with normal mucosa.   EYES: Clear bilaterally, pupils equal, round and reactive to light. EOMI.  CARDIAC: Normal rate, regular rhythm.  Heart sounds S1, S2.  No murmurs, rubs or gallops   RESPIRATORY: Breath sounds clear and equal bilaterally. No wheezes, rhales or rhonchi   ABDOMINAL: Nontender to palpation. No rebound/ guarding   MUSCULOSKELETAL: Spine appears normal, range of motion is not limited, no muscle or joint tenderness   EXTREMITIES: No edema, cyanosis or deformity   NEUROLOGICAL: Alert and oriented, no focal deficits, no motor or sensory deficits   SKIN: R axilla with swelling, pain and erythema, +GIRISH     Agree with A/P above   Cellulitis of R axilla r/o abscess  Reviewed imaging  Per pt, swelling and pain is worsening despite IV antibiotics  Will ask Surgery to re-evaluate   c/w IV antibiotics    Thyroid nodules  CT showing: 3.4 cm right thyroid nodule. An additional 1 cm left thyroid nodule is noted  US thyroid as outpt for further work up

## 2022-08-16 NOTE — CONSULT NOTE ADULT - SUBJECTIVE AND OBJECTIVE BOX
INFECTIOUS DISEASES AND INTERNAL MEDICINE at [place]  =======================================================  Musa Akbar MD  Diplomates American Board of Internal Medicine and Infectious Diseases  Telephone [phone]  Fax            [phone]  =======================================================    MARLO [id number]      HPI:  67 year old female with a h/o seasonal allergies presets from home with pain and swelling of the Rt axilla. She reports that 5-6 days ago she was scratching a freckle like lesion in the area and the next day had some pain at the site. The area became progressively more erythematous and painful and she reports fevers of 107 F at home with rigors. She states that she took some Tylenol and placed ice packs on her abd which broke her fevers. Denies prior similar problems in the area, denies recurrent cellulitis or bois. She does note that she was told by her PCP arya her WBC count was low and was seeing Dr Solano for it who ruled out leukemia/lymphoma  AS ABOVE . PT UNDERWENT I AND D AND WAS DISCHARGED HOME RETURNED WITH INCREASED PAIN AND FEVER  ON IV ABX ASKED TO EVALUATE FROM ID STANDPOINT          PAST MEDICAL & SURGICAL HISTORY:  No pertinent past medical history      HTN (hypertension)      H/O: hysterectomy          ANTIBIOTICS  meropenem  IVPB 1000 milliGRAM(s) IV Intermittent every 8 hours  vancomycin  IVPB 1250 milliGRAM(s) IV Intermittent every 12 hours  vancomycin  IVPB          Allergies    No Known Allergies    Intolerances        SOCIAL HISTORY:     FAMILY HX   FAMILY HISTORY:  FH: breast cancer (Mother)    FH: HTN (hypertension) (Mother)        Vital Signs Last 24 Hrs  T(C): 36.7 (16 Aug 2022 07:20), Max: 36.9 (15 Aug 2022 20:10)  T(F): 98.1 (16 Aug 2022 07:20), Max: 98.5 (15 Aug 2022 20:10)  HR: 65 (16 Aug 2022 07:20) (59 - 70)  BP: 127/76 (16 Aug 2022 07:20) (108/66 - 134/80)  BP(mean): 98 (15 Aug 2022 20:10) (98 - 98)  RR: 18 (16 Aug 2022 07:20) (18 - 19)  SpO2: 96% (16 Aug 2022 07:20) (96% - 99%)    Parameters below as of 16 Aug 2022 07:20  Patient On (Oxygen Delivery Method): room air      Drug Dosing Weight  Height (cm): 162.6 (14 Aug 2022 19:37)  Weight (kg): 77.1 (14 Aug 2022 19:37)  BMI (kg/m2): 29.2 (14 Aug 2022 19:37)  BSA (m2): 1.83 (14 Aug 2022 19:37)      REVIEW OF SYSTEMS:    CONSTITUTIONAL:  As per HPI.    HEENT:  Eyes:  No diplopia or blurred vision. ENT:  No earache, sore throat or runny nose.    CARDIOVASCULAR:  No pressure, squeezing, strangling, tightness, heaviness or aching about the chest, neck, axilla or epigastrium.    RESPIRATORY:  No cough, shortness of breath, PND or orthopnea.    GASTROINTESTINAL:  No nausea, vomiting or diarrhea.    GENITOURINARY:  No dysuria, frequency or urgency.    MUSCULOSKELETAL:  As per HPI.    SKIN:  No change in skin, hair or nails.    NEUROLOGIC:  No paresthesias, fasciculations, seizures or weakness.                  PHYSICAL EXAMINATION:    GENERAL: The patient is a _____in no apparent distress. ___     VITAL SIGNS: T(C): 36.7 (08-16-22 @ 07:20), Max: 36.9 (08-15-22 @ 20:10)  HR: 65 (08-16-22 @ 07:20) (59 - 70)  BP: 127/76 (08-16-22 @ 07:20) (108/66 - 134/80)  RR: 18 (08-16-22 @ 07:20) (18 - 19)  SpO2: 96% (08-16-22 @ 07:20) (96% - 99%)  Wt(kg): --    HEENT: Head is normocephalic and atraumatic.  ANICTERIC  NECK: Supple. No carotid bruits.  No lymphadenopathy or thyromegaly.    LUNGS:COARSE BREATH SOUNDS    HEART: Regular rate and rhythm without murmur.    ABDOMEN: Soft, nontender, and nondistended.  Positive bowel sounds.  No hepatosplenomegaly was noted. NO REBOUND NO GUARDING    EXTREMITIES: NO EDEMA NO ERYTHEMA    NEUROLOGIC: NON FOCAL      SKIN: RIGHT AXILLAE WITH  SEVERAL AREAS OF INDURATION ERYTHEMA WARMTH AND TENDERNESS NO DRAINAGE          BLOOD CULTURES       URINE CX          LABS:                        10.7   6.25  )-----------( 278      ( 16 Aug 2022 04:16 )             32.8     08-16    142  |  104  |  14.7  ----------------------------<  112<H>  4.6   |  28.0  |  0.85    Ca    9.0      16 Aug 2022 04:16    TPro  6.8  /  Alb  3.9  /  TBili  <0.2<L>  /  DBili  x   /  AST  23  /  ALT  24  /  AlkPhos  73  08-14    PT/INR - ( 14 Aug 2022 21:29 )   PT: 13.1 sec;   INR: 1.13 ratio         PTT - ( 14 Aug 2022 21:29 )  PTT:28.0 sec      RADIOLOGY & ADDITIONAL STUDIES:      ASSESSMENT/PLAN  67 year old female with a h/o seasonal allergies presets from home with pain and swelling of the Rt axilla. She reports that 5-6 days ago she was scratching a freckle like lesion in the area and the next day had some pain at the site. The area became progressively more erythematous and painful and she reports fevers of 107 F at home with rigors. She states that she took some Tylenol and placed ice packs on her abd which broke her fevers. Denies prior similar problems in the area, denies recurrent cellulitis or bois. She does note that she was told by her PCP arya her WBC count was low and was seeing Dr Solano for it who ruled out leukemia/lymphoma  AS ABOVE . PT UNDERWENT I AND D AND WAS DISCHARGED HOME RETURNED WITH INCREASED PAIN AND FEVER  WILL  CHANGE ROCEPHIN  TO MERREM TO COVER MORE Broadly  AGREE WILL NEED SURGICAL Drainage  WOULD SEND OPERATIVE CX ALTHOUGH LOWER YIELD AS PT  ALREADY ON ABX   WILL FOLLOW UP SPOKE TO  HOSPITALIST                  DEVON MARIO MD

## 2022-08-16 NOTE — PROGRESS NOTE ADULT - ASSESSMENT
66 yo F who presents with R axillary cellulitis and induration with an area of fat stranding and fluid.     Plan:   - Admitted to medicine service   - Agree with broad spectrum IV abx   - Warm compresses   - Supportive care, anti-inflammatories   - No acute surgical intervention at this time, surgery will continue to follow         No fluctuant mass, drainable collection at this time for bedside I&D         If coalesces or forms into a definitive lesion will consider intervention     Plan discussed with on call Surgery Attending.

## 2022-08-17 ENCOUNTER — TRANSCRIPTION ENCOUNTER (OUTPATIENT)
Age: 67
End: 2022-08-17

## 2022-08-17 LAB
ANION GAP SERPL CALC-SCNC: 9 MMOL/L — SIGNIFICANT CHANGE UP (ref 5–17)
BASOPHILS # BLD AUTO: 0.02 K/UL — SIGNIFICANT CHANGE UP (ref 0–0.2)
BASOPHILS NFR BLD AUTO: 0.4 % — SIGNIFICANT CHANGE UP (ref 0–2)
BUN SERPL-MCNC: 12.4 MG/DL — SIGNIFICANT CHANGE UP (ref 8–20)
CALCIUM SERPL-MCNC: 8.7 MG/DL — SIGNIFICANT CHANGE UP (ref 8.4–10.5)
CHLORIDE SERPL-SCNC: 103 MMOL/L — SIGNIFICANT CHANGE UP (ref 98–107)
CO2 SERPL-SCNC: 27 MMOL/L — SIGNIFICANT CHANGE UP (ref 22–29)
CREAT SERPL-MCNC: 0.72 MG/DL — SIGNIFICANT CHANGE UP (ref 0.5–1.3)
EGFR: 93 ML/MIN/1.73M2 — SIGNIFICANT CHANGE UP
EOSINOPHIL # BLD AUTO: 0.31 K/UL — SIGNIFICANT CHANGE UP (ref 0–0.5)
EOSINOPHIL NFR BLD AUTO: 6.6 % — HIGH (ref 0–6)
GLUCOSE SERPL-MCNC: 101 MG/DL — HIGH (ref 70–99)
HCT VFR BLD CALC: 33.3 % — LOW (ref 34.5–45)
HGB BLD-MCNC: 10.8 G/DL — LOW (ref 11.5–15.5)
IMM GRANULOCYTES NFR BLD AUTO: 1.1 % — SIGNIFICANT CHANGE UP (ref 0–1.5)
LYMPHOCYTES # BLD AUTO: 1.71 K/UL — SIGNIFICANT CHANGE UP (ref 1–3.3)
LYMPHOCYTES # BLD AUTO: 36.2 % — SIGNIFICANT CHANGE UP (ref 13–44)
MCHC RBC-ENTMCNC: 27.6 PG — SIGNIFICANT CHANGE UP (ref 27–34)
MCHC RBC-ENTMCNC: 32.4 GM/DL — SIGNIFICANT CHANGE UP (ref 32–36)
MCV RBC AUTO: 84.9 FL — SIGNIFICANT CHANGE UP (ref 80–100)
MONOCYTES # BLD AUTO: 0.44 K/UL — SIGNIFICANT CHANGE UP (ref 0–0.9)
MONOCYTES NFR BLD AUTO: 9.3 % — SIGNIFICANT CHANGE UP (ref 2–14)
NEUTROPHILS # BLD AUTO: 2.19 K/UL — SIGNIFICANT CHANGE UP (ref 1.8–7.4)
NEUTROPHILS NFR BLD AUTO: 46.4 % — SIGNIFICANT CHANGE UP (ref 43–77)
PLATELET # BLD AUTO: 308 K/UL — SIGNIFICANT CHANGE UP (ref 150–400)
POTASSIUM SERPL-MCNC: 4.3 MMOL/L — SIGNIFICANT CHANGE UP (ref 3.5–5.3)
POTASSIUM SERPL-SCNC: 4.3 MMOL/L — SIGNIFICANT CHANGE UP (ref 3.5–5.3)
RBC # BLD: 3.92 M/UL — SIGNIFICANT CHANGE UP (ref 3.8–5.2)
RBC # FLD: 12.6 % — SIGNIFICANT CHANGE UP (ref 10.3–14.5)
SODIUM SERPL-SCNC: 139 MMOL/L — SIGNIFICANT CHANGE UP (ref 135–145)
VANCOMYCIN TROUGH SERPL-MCNC: 17 UG/ML — SIGNIFICANT CHANGE UP (ref 10–20)
WBC # BLD: 4.72 K/UL — SIGNIFICANT CHANGE UP (ref 3.8–10.5)
WBC # FLD AUTO: 4.72 K/UL — SIGNIFICANT CHANGE UP (ref 3.8–10.5)

## 2022-08-17 PROCEDURE — 10060 I&D ABSCESS SIMPLE/SINGLE: CPT

## 2022-08-17 PROCEDURE — 99233 SBSQ HOSP IP/OBS HIGH 50: CPT

## 2022-08-17 RX ORDER — MORPHINE SULFATE 50 MG/1
2 CAPSULE, EXTENDED RELEASE ORAL ONCE
Refills: 0 | Status: DISCONTINUED | OUTPATIENT
Start: 2022-08-17 | End: 2022-08-17

## 2022-08-17 RX ORDER — VANCOMYCIN HCL 1 G
1000 VIAL (EA) INTRAVENOUS EVERY 12 HOURS
Refills: 0 | Status: DISCONTINUED | OUTPATIENT
Start: 2022-08-17 | End: 2022-08-17

## 2022-08-17 RX ORDER — SODIUM CHLORIDE 9 MG/ML
1000 INJECTION, SOLUTION INTRAVENOUS
Refills: 0 | Status: DISCONTINUED | OUTPATIENT
Start: 2022-08-17 | End: 2022-08-17

## 2022-08-17 RX ORDER — LANOLIN ALCOHOL/MO/W.PET/CERES
3 CREAM (GRAM) TOPICAL AT BEDTIME
Refills: 0 | Status: DISCONTINUED | OUTPATIENT
Start: 2022-08-17 | End: 2022-08-22

## 2022-08-17 RX ORDER — FENTANYL CITRATE 50 UG/ML
50 INJECTION INTRAVENOUS
Refills: 0 | Status: DISCONTINUED | OUTPATIENT
Start: 2022-08-17 | End: 2022-08-17

## 2022-08-17 RX ORDER — MEROPENEM 1 G/30ML
1000 INJECTION INTRAVENOUS EVERY 8 HOURS
Refills: 0 | Status: DISCONTINUED | OUTPATIENT
Start: 2022-08-17 | End: 2022-08-19

## 2022-08-17 RX ORDER — ONDANSETRON 8 MG/1
4 TABLET, FILM COATED ORAL EVERY 8 HOURS
Refills: 0 | Status: DISCONTINUED | OUTPATIENT
Start: 2022-08-17 | End: 2022-08-22

## 2022-08-17 RX ORDER — FENTANYL CITRATE 50 UG/ML
25 INJECTION INTRAVENOUS
Refills: 0 | Status: DISCONTINUED | OUTPATIENT
Start: 2022-08-17 | End: 2022-08-17

## 2022-08-17 RX ORDER — ACETAMINOPHEN 500 MG
650 TABLET ORAL EVERY 6 HOURS
Refills: 0 | Status: DISCONTINUED | OUTPATIENT
Start: 2022-08-17 | End: 2022-08-22

## 2022-08-17 RX ADMIN — MORPHINE SULFATE 2 MILLIGRAM(S): 50 CAPSULE, EXTENDED RELEASE ORAL at 21:14

## 2022-08-17 RX ADMIN — SODIUM CHLORIDE 120 MILLILITER(S): 9 INJECTION, SOLUTION INTRAVENOUS at 11:46

## 2022-08-17 RX ADMIN — MORPHINE SULFATE 2 MILLIGRAM(S): 50 CAPSULE, EXTENDED RELEASE ORAL at 22:14

## 2022-08-17 RX ADMIN — Medication 650 MILLIGRAM(S): at 12:17

## 2022-08-17 RX ADMIN — PANTOPRAZOLE SODIUM 40 MILLIGRAM(S): 20 TABLET, DELAYED RELEASE ORAL at 06:28

## 2022-08-17 RX ADMIN — LORATADINE 10 MILLIGRAM(S): 10 TABLET ORAL at 12:18

## 2022-08-17 RX ADMIN — Medication 166.67 MILLIGRAM(S): at 05:51

## 2022-08-17 RX ADMIN — MORPHINE SULFATE 4 MILLIGRAM(S): 50 CAPSULE, EXTENDED RELEASE ORAL at 10:37

## 2022-08-17 RX ADMIN — MEROPENEM 100 MILLIGRAM(S): 1 INJECTION INTRAVENOUS at 21:14

## 2022-08-17 RX ADMIN — MORPHINE SULFATE 4 MILLIGRAM(S): 50 CAPSULE, EXTENDED RELEASE ORAL at 05:09

## 2022-08-17 RX ADMIN — MEROPENEM 100 MILLIGRAM(S): 1 INJECTION INTRAVENOUS at 05:50

## 2022-08-17 RX ADMIN — Medication 650 MILLIGRAM(S): at 13:15

## 2022-08-17 RX ADMIN — HEPARIN SODIUM 5000 UNIT(S): 5000 INJECTION INTRAVENOUS; SUBCUTANEOUS at 05:50

## 2022-08-17 RX ADMIN — MORPHINE SULFATE 4 MILLIGRAM(S): 50 CAPSULE, EXTENDED RELEASE ORAL at 11:03

## 2022-08-17 RX ADMIN — MORPHINE SULFATE 4 MILLIGRAM(S): 50 CAPSULE, EXTENDED RELEASE ORAL at 04:32

## 2022-08-17 RX ADMIN — MEROPENEM 100 MILLIGRAM(S): 1 INJECTION INTRAVENOUS at 14:41

## 2022-08-17 RX ADMIN — Medication 81 MILLIGRAM(S): at 12:17

## 2022-08-17 RX ADMIN — FENTANYL CITRATE 50 MICROGRAM(S): 50 INJECTION INTRAVENOUS at 17:33

## 2022-08-17 NOTE — PATIENT PROFILE ADULT - FALL HARM RISK - UNIVERSAL INTERVENTIONS
Bed in lowest position, wheels locked, appropriate side rails in place/Call bell, personal items and telephone in reach/Instruct patient to call for assistance before getting out of bed or chair/Non-slip footwear when patient is out of bed/[place] to call system/Physically safe environment - no spills, clutter or unnecessary equipment/Purposeful Proactive Rounding/Room/bathroom lighting operational, light cord in reach

## 2022-08-17 NOTE — BRIEF OPERATIVE NOTE - OPERATION/FINDINGS
Abscess with loculation that extended upward from the abscess that was drained with about 10cc of pus. 2 cultures were collected and to sent to microbiology.

## 2022-08-17 NOTE — PROGRESS NOTE ADULT - ASSESSMENT
68 yo F who presents with R axillary cellulitis, stable going to OR today.     Plan:   - Admitted to medicine service   - Agree with broad spectrum IV abx   - Warm compresses   - Will go to OR today, NPO/ ivf until then

## 2022-08-17 NOTE — PROGRESS NOTE ADULT - ATTENDING COMMENTS
The right axilla is extremely tender preventing an adequate exam. I suspect the patients has a large deep collection which should be assessed under anesthesia and drained if appropriate.

## 2022-08-17 NOTE — PROGRESS NOTE ADULT - ASSESSMENT
67 year old female with seasonal allergies presents with 6 days of fevers, rigors and worsening erythema and pain over Rt Axillary space with extension onto back      Cellulitis w/ underlying abscess  Secondary to skin microtrauma by pt (unlikely hidradenitis, non apocrine and no prior history)    CT Chest with approximate 5 cm area of fat stranding and fluid noted of the right axilla extending along the right lateral upper chest.  f/u blood cultures (x2) till finalized although neg cultures may not be accurate as Abx were initiated 24 hours prior to obtaining   Surgery following - awaiting OR today for debridement (requested to send specimen for culture to assist in organism ID)    Continue Vancomycin and IV Ceftriaxone for now  MRSA PCR from nares negative  HbA1C 5.7  Morphine / Tylenol PRN for pain / fever    Seasonal Allergies  Loratadine  Albuterol Inh PRN     Reported Leucopenia  Per pt, hematological malignancy w/u was neg  If ja is low, would explain lack of elevated WBC and current 10.7 would be relative leukocytosis    Outpt f/u with Dr Solano    Breast nodule  On palpation nodule present in LLQ (left breast)   Reports up to date with mammograms   PCP f/u reinforced      DVT ppx : Heparin s/c Q 12          67 year old female with seasonal allergies presents with 6 days of fevers, rigors and worsening erythema and pain over Rt Axillary space with extension onto back      Cellulitis w/ underlying abscess  Secondary to skin microtrauma by pt (unlikely hidradenitis, non apocrine and no prior history)    CT Chest with approximate 5 cm area of fat stranding and fluid noted of the right axilla extending along the right lateral upper chest.  f/u blood cultures (x2) till finalized although neg cultures may not be accurate as Abx were initiated 24 hours prior to obtaining   Surgery following - awaiting OR today for debridement (requested to send specimen for culture to assist in organism ID)    Continue Vancomycin and IV Ceftriaxone for now  MRSA PCR from nares negative  HbA1C 5.7  Morphine / Tylenol PRN for pain / fever    Seasonal Allergies  Loratadine  Albuterol Inh PRN     Reported Leucopenia  Per pt, hematological malignancy w/u was neg  If ja is low, would explain lack of elevated WBC and current 10.7 would be relative leukocytosis    Outpt f/u with Dr Solano    Breast nodule  On palpation nodule present in LLQ (left breast)   Reports up to date with mammograms   PCP f/u reinforced    Thyroid nodules  CT showing: 3.4 cm right thyroid nodule. An additional 1 cm left thyroid nodule is noted  US thyroid as outpt for further work up.      DVT ppx : Heparin s/c Q 12

## 2022-08-17 NOTE — PROGRESS NOTE ADULT - ASSESSMENT
67 year old female with a h/o seasonal allergies presets from home with pain and swelling of the Rt axilla. She reports that 5-6 days ago she was scratching a freckle like lesion in the area and the next day had some pain at the site. The area became progressively more erythematous and painful and she reports fevers of 107 F at home with rigors. She states that she took some Tylenol and placed ice packs on her abd which broke her fevers. Denies prior similar problems in the area, denies recurrent cellulitis or bois. She does note that she was told by her PCP arya her WBC count was low and was seeing Dr Solano for it who ruled out leukemia/lymphoma  AS ABOVE . PT UNDERWENT I AND D AND WAS DISCHARGED HOME RETURNED WITH INCREASED PAIN AND FEVER    ON  MERREM  VANOCTO COVER MORE Broadly  AGREE WILL NEED SURGICAL Drainage  FOR OR TODAY   WOULD SEND OPERATIVE CX ALTHOUGH LOWER YIELD AS PT  ALREADY ON ABX   WILL FOLLOW UP SPOKE TO  HOSPITALIST

## 2022-08-17 NOTE — PROGRESS NOTE ADULT - NS ATTEND AMEND GEN_ALL_CORE FT
Pt seen and examined at bedside  Pt reports swelling and pain is a little better since yesterday     CONSTITUTIONAL: Awake, alert and in no apparent distress  ENMT: Airway patent, Nasal mucosa clear. Mouth with normal mucosa.   EYES: Clear bilaterally, pupils equal, round and reactive to light. EOMI.  CARDIAC: Normal rate, regular rhythm.  Heart sounds S1, S2.  No murmurs, rubs or gallops   RESPIRATORY: Breath sounds clear and equal bilaterally. No wheezes, rhales or rhonchi   ABDOMINAL: Nontender to palpation. No rebound/ guarding   MUSCULOSKELETAL: Spine appears normal, range of motion is not limited, no muscle or joint tenderness   EXTREMITIES: No edema, cyanosis or deformity   NEUROLOGICAL: Alert and oriented, no focal deficits, no motor or sensory deficits   SKIN: R axilla with swelling, pain and erythema, +GIRISH     Agree with A/P above   Cellulitis of R axilla r/o abscess  Per surgery plan for OR, IV antibiotics broadened per ID

## 2022-08-18 LAB
ANION GAP SERPL CALC-SCNC: 12 MMOL/L — SIGNIFICANT CHANGE UP (ref 5–17)
BASOPHILS # BLD AUTO: 0.01 K/UL — SIGNIFICANT CHANGE UP (ref 0–0.2)
BASOPHILS NFR BLD AUTO: 0.2 % — SIGNIFICANT CHANGE UP (ref 0–2)
BUN SERPL-MCNC: 16.4 MG/DL — SIGNIFICANT CHANGE UP (ref 8–20)
CALCIUM SERPL-MCNC: 8.9 MG/DL — SIGNIFICANT CHANGE UP (ref 8.4–10.5)
CHLORIDE SERPL-SCNC: 98 MMOL/L — SIGNIFICANT CHANGE UP (ref 98–107)
CO2 SERPL-SCNC: 26 MMOL/L — SIGNIFICANT CHANGE UP (ref 22–29)
CREAT SERPL-MCNC: 0.7 MG/DL — SIGNIFICANT CHANGE UP (ref 0.5–1.3)
EGFR: 96 ML/MIN/1.73M2 — SIGNIFICANT CHANGE UP
EOSINOPHIL # BLD AUTO: 0.01 K/UL — SIGNIFICANT CHANGE UP (ref 0–0.5)
EOSINOPHIL NFR BLD AUTO: 0.2 % — SIGNIFICANT CHANGE UP (ref 0–6)
GLUCOSE SERPL-MCNC: 180 MG/DL — HIGH (ref 70–99)
HCT VFR BLD CALC: 35 % — SIGNIFICANT CHANGE UP (ref 34.5–45)
HGB BLD-MCNC: 11 G/DL — LOW (ref 11.5–15.5)
IMM GRANULOCYTES NFR BLD AUTO: 1.3 % — SIGNIFICANT CHANGE UP (ref 0–1.5)
LYMPHOCYTES # BLD AUTO: 0.82 K/UL — LOW (ref 1–3.3)
LYMPHOCYTES # BLD AUTO: 13.5 % — SIGNIFICANT CHANGE UP (ref 13–44)
MCHC RBC-ENTMCNC: 26.6 PG — LOW (ref 27–34)
MCHC RBC-ENTMCNC: 31.4 GM/DL — LOW (ref 32–36)
MCV RBC AUTO: 84.5 FL — SIGNIFICANT CHANGE UP (ref 80–100)
MONOCYTES # BLD AUTO: 0.29 K/UL — SIGNIFICANT CHANGE UP (ref 0–0.9)
MONOCYTES NFR BLD AUTO: 4.8 % — SIGNIFICANT CHANGE UP (ref 2–14)
NEUTROPHILS # BLD AUTO: 4.86 K/UL — SIGNIFICANT CHANGE UP (ref 1.8–7.4)
NEUTROPHILS NFR BLD AUTO: 80 % — HIGH (ref 43–77)
PLATELET # BLD AUTO: 335 K/UL — SIGNIFICANT CHANGE UP (ref 150–400)
POTASSIUM SERPL-MCNC: 4.3 MMOL/L — SIGNIFICANT CHANGE UP (ref 3.5–5.3)
POTASSIUM SERPL-SCNC: 4.3 MMOL/L — SIGNIFICANT CHANGE UP (ref 3.5–5.3)
RBC # BLD: 4.14 M/UL — SIGNIFICANT CHANGE UP (ref 3.8–5.2)
RBC # FLD: 12.6 % — SIGNIFICANT CHANGE UP (ref 10.3–14.5)
SODIUM SERPL-SCNC: 136 MMOL/L — SIGNIFICANT CHANGE UP (ref 135–145)
WBC # BLD: 6.07 K/UL — SIGNIFICANT CHANGE UP (ref 3.8–10.5)
WBC # FLD AUTO: 6.07 K/UL — SIGNIFICANT CHANGE UP (ref 3.8–10.5)

## 2022-08-18 PROCEDURE — 99233 SBSQ HOSP IP/OBS HIGH 50: CPT

## 2022-08-18 RX ORDER — HEPARIN SODIUM 5000 [USP'U]/ML
5000 INJECTION INTRAVENOUS; SUBCUTANEOUS EVERY 8 HOURS
Refills: 0 | Status: DISCONTINUED | OUTPATIENT
Start: 2022-08-18 | End: 2022-08-22

## 2022-08-18 RX ORDER — MORPHINE SULFATE 50 MG/1
2 CAPSULE, EXTENDED RELEASE ORAL EVERY 4 HOURS
Refills: 0 | Status: DISCONTINUED | OUTPATIENT
Start: 2022-08-18 | End: 2022-08-22

## 2022-08-18 RX ORDER — VANCOMYCIN HCL 1 G
1000 VIAL (EA) INTRAVENOUS EVERY 12 HOURS
Refills: 0 | Status: DISCONTINUED | OUTPATIENT
Start: 2022-08-18 | End: 2022-08-22

## 2022-08-18 RX ADMIN — MEROPENEM 100 MILLIGRAM(S): 1 INJECTION INTRAVENOUS at 21:10

## 2022-08-18 RX ADMIN — MORPHINE SULFATE 2 MILLIGRAM(S): 50 CAPSULE, EXTENDED RELEASE ORAL at 16:01

## 2022-08-18 RX ADMIN — MEROPENEM 100 MILLIGRAM(S): 1 INJECTION INTRAVENOUS at 14:31

## 2022-08-18 RX ADMIN — Medication 250 MILLIGRAM(S): at 05:16

## 2022-08-18 RX ADMIN — Medication 250 MILLIGRAM(S): at 18:10

## 2022-08-18 RX ADMIN — MORPHINE SULFATE 2 MILLIGRAM(S): 50 CAPSULE, EXTENDED RELEASE ORAL at 16:38

## 2022-08-18 RX ADMIN — MEROPENEM 100 MILLIGRAM(S): 1 INJECTION INTRAVENOUS at 05:18

## 2022-08-18 RX ADMIN — HEPARIN SODIUM 5000 UNIT(S): 5000 INJECTION INTRAVENOUS; SUBCUTANEOUS at 21:11

## 2022-08-18 RX ADMIN — HEPARIN SODIUM 5000 UNIT(S): 5000 INJECTION INTRAVENOUS; SUBCUTANEOUS at 14:31

## 2022-08-18 RX ADMIN — Medication 650 MILLIGRAM(S): at 12:25

## 2022-08-18 RX ADMIN — Medication 650 MILLIGRAM(S): at 03:17

## 2022-08-18 RX ADMIN — Medication 650 MILLIGRAM(S): at 04:15

## 2022-08-18 RX ADMIN — Medication 650 MILLIGRAM(S): at 13:14

## 2022-08-18 NOTE — PROGRESS NOTE ADULT - ASSESSMENT
Sage 67y F that is pod 1 I&D of r axilla abscess that is doing well post op.    - Continue care per primary team  - Continue Vanc and meropenum  - Dressing changes Tid of stained dressing.         - Remove previous dressing and pack the site with saline dampened kerlix and cover with 4x4 and tape.   - Continue regular diet

## 2022-08-18 NOTE — PROGRESS NOTE ADULT - ASSESSMENT
67 year old female with seasonal allergies presents with 6 days of fevers, rigors and worsening erythema and pain over Rt Axillary space with extension onto back.     R axilla abscess  CT Chest with approximate 5 cm area of fat stranding and fluid noted of the right axilla extending along the right lateral upper chest   s/p I&D of R axilla abscess 8/17   Blood cultures (x2) with NGTD   Surgery following, await cultures from abscess   Continue Vancomycin and IV Merrem for now   MRSA PCR from nares negative   HbA1C 5.7   c/w Pain meds     Reported Leucopenia  If ja is low, would explain lack of elevated WBC and current 10.7 would be relative leukocytosis    Appreciate Heme/Onc consult   Outpt f/u with Dr Solano    Seasonal Allergies  Loratadine   Albuterol Inh PRN     Breast nodule  On palpation nodule present in LLQ (left breast)   Reports up to date with mammograms   PCP f/u reinforced    Thyroid nodules  CT showing: 3.4 cm right thyroid nodule. An additional 1 cm left thyroid nodule is noted  US thyroid as outpt for further work up     DVT ppx: Heparin SQ

## 2022-08-18 NOTE — CONSULT NOTE ADULT - SUBJECTIVE AND OBJECTIVE BOX
67 year old female with a h/o seasonal allergies presents from home with pain and swelling of the Rt axilla. She reports that 5-6 days ago she was scratching a freckle like lesion in the area and the next day had some pain at the site. The area became progressively more erythematous and painful and she reports fevers of 107 F at home with rigors.   s/p I and D R axillary abscess    seen at bedside, afebrile    ROS  negative other than HPI    PAST MEDICAL & SURGICAL HISTORY:  No pertinent past medical history      HTN (hypertension)      H/O: hysterectomy      Social History:  Lives at home, (15 Aug 2022 14:16)    FAMILY HISTORY:  FH: breast cancer (Mother)    FH: HTN (hypertension) (Mother)      ICU Vital Signs Last 24 Hrs  T(C): 36.8 (18 Aug 2022 07:37), Max: 36.8 (17 Aug 2022 17:55)  T(F): 98.2 (18 Aug 2022 07:37), Max: 98.2 (17 Aug 2022 17:55)  HR: 59 (18 Aug 2022 07:37) (58 - 96)  BP: 106/65 (18 Aug 2022 07:37) (102/63 - 156/74)  BP(mean): 86 (17 Aug 2022 18:10) (86 - 96)  ABP: --  ABP(mean): --  RR: 20 (18 Aug 2022 07:37) (12 - 21)  SpO2: 97% (18 Aug 2022 07:37) (95% - 99%)    O2 Parameters below as of 18 Aug 2022 07:37  Patient On (Oxygen Delivery Method): room air    Gen - alert and oriented  Heart - s1s2 rrr  Lung - dec BS b/l  Abd - soft ND NT  eXT - No edema    MEDICATIONS  (STANDING):  meropenem  IVPB 1000 milliGRAM(s) IV Intermittent every 8 hours  vancomycin  IVPB 1000 milliGRAM(s) IV Intermittent every 12 hours                            11.0   6.07  )-----------( 335      ( 18 Aug 2022 05:43 )             35.0       08-18    136  |  98  |  16.4  ----------------------------<  180<H>  4.3   |  26.0  |  0.70    Ca    8.9      18 Aug 2022 05:43

## 2022-08-19 ENCOUNTER — TRANSCRIPTION ENCOUNTER (OUTPATIENT)
Age: 67
End: 2022-08-19

## 2022-08-19 DIAGNOSIS — E04.1 NONTOXIC SINGLE THYROID NODULE: ICD-10-CM

## 2022-08-19 LAB
-  AMPICILLIN/SULBACTAM: SIGNIFICANT CHANGE UP
-  CEFAZOLIN: SIGNIFICANT CHANGE UP
-  CLINDAMYCIN: SIGNIFICANT CHANGE UP
-  DAPTOMYCIN: SIGNIFICANT CHANGE UP
-  ERYTHROMYCIN: SIGNIFICANT CHANGE UP
-  GENTAMICIN: SIGNIFICANT CHANGE UP
-  LINEZOLID: SIGNIFICANT CHANGE UP
-  OXACILLIN: SIGNIFICANT CHANGE UP
-  PENICILLIN: SIGNIFICANT CHANGE UP
-  RIFAMPIN: SIGNIFICANT CHANGE UP
-  TETRACYCLINE: SIGNIFICANT CHANGE UP
-  TRIMETHOPRIM/SULFAMETHOXAZOLE: SIGNIFICANT CHANGE UP
-  VANCOMYCIN: SIGNIFICANT CHANGE UP
ANION GAP SERPL CALC-SCNC: 12 MMOL/L — SIGNIFICANT CHANGE UP (ref 5–17)
BASOPHILS # BLD AUTO: 0.02 K/UL — SIGNIFICANT CHANGE UP (ref 0–0.2)
BASOPHILS NFR BLD AUTO: 0.3 % — SIGNIFICANT CHANGE UP (ref 0–2)
BUN SERPL-MCNC: 12.8 MG/DL — SIGNIFICANT CHANGE UP (ref 8–20)
CALCIUM SERPL-MCNC: 9.1 MG/DL — SIGNIFICANT CHANGE UP (ref 8.4–10.5)
CHLORIDE SERPL-SCNC: 103 MMOL/L — SIGNIFICANT CHANGE UP (ref 98–107)
CO2 SERPL-SCNC: 26 MMOL/L — SIGNIFICANT CHANGE UP (ref 22–29)
CREAT SERPL-MCNC: 0.67 MG/DL — SIGNIFICANT CHANGE UP (ref 0.5–1.3)
EGFR: 97 ML/MIN/1.73M2 — SIGNIFICANT CHANGE UP
EOSINOPHIL # BLD AUTO: 0.2 K/UL — SIGNIFICANT CHANGE UP (ref 0–0.5)
EOSINOPHIL NFR BLD AUTO: 3.3 % — SIGNIFICANT CHANGE UP (ref 0–6)
GLUCOSE SERPL-MCNC: 79 MG/DL — SIGNIFICANT CHANGE UP (ref 70–99)
HCT VFR BLD CALC: 38 % — SIGNIFICANT CHANGE UP (ref 34.5–45)
HGB BLD-MCNC: 12.3 G/DL — SIGNIFICANT CHANGE UP (ref 11.5–15.5)
IMM GRANULOCYTES NFR BLD AUTO: 1.2 % — SIGNIFICANT CHANGE UP (ref 0–1.5)
LYMPHOCYTES # BLD AUTO: 1.77 K/UL — SIGNIFICANT CHANGE UP (ref 1–3.3)
LYMPHOCYTES # BLD AUTO: 29.2 % — SIGNIFICANT CHANGE UP (ref 13–44)
MCHC RBC-ENTMCNC: 27.8 PG — SIGNIFICANT CHANGE UP (ref 27–34)
MCHC RBC-ENTMCNC: 32.4 GM/DL — SIGNIFICANT CHANGE UP (ref 32–36)
MCV RBC AUTO: 85.8 FL — SIGNIFICANT CHANGE UP (ref 80–100)
METHOD TYPE: SIGNIFICANT CHANGE UP
MONOCYTES # BLD AUTO: 0.55 K/UL — SIGNIFICANT CHANGE UP (ref 0–0.9)
MONOCYTES NFR BLD AUTO: 9.1 % — SIGNIFICANT CHANGE UP (ref 2–14)
NEUTROPHILS # BLD AUTO: 3.46 K/UL — SIGNIFICANT CHANGE UP (ref 1.8–7.4)
NEUTROPHILS NFR BLD AUTO: 56.9 % — SIGNIFICANT CHANGE UP (ref 43–77)
PLATELET # BLD AUTO: 347 K/UL — SIGNIFICANT CHANGE UP (ref 150–400)
POTASSIUM SERPL-MCNC: 4.1 MMOL/L — SIGNIFICANT CHANGE UP (ref 3.5–5.3)
POTASSIUM SERPL-SCNC: 4.1 MMOL/L — SIGNIFICANT CHANGE UP (ref 3.5–5.3)
RBC # BLD: 4.43 M/UL — SIGNIFICANT CHANGE UP (ref 3.8–5.2)
RBC # FLD: 12.8 % — SIGNIFICANT CHANGE UP (ref 10.3–14.5)
SODIUM SERPL-SCNC: 141 MMOL/L — SIGNIFICANT CHANGE UP (ref 135–145)
WBC # BLD: 6.07 K/UL — SIGNIFICANT CHANGE UP (ref 3.8–10.5)
WBC # FLD AUTO: 6.07 K/UL — SIGNIFICANT CHANGE UP (ref 3.8–10.5)

## 2022-08-19 PROCEDURE — 99232 SBSQ HOSP IP/OBS MODERATE 35: CPT

## 2022-08-19 PROCEDURE — 93971 EXTREMITY STUDY: CPT | Mod: 26,LT

## 2022-08-19 RX ORDER — OXYCODONE AND ACETAMINOPHEN 5; 325 MG/1; MG/1
1 TABLET ORAL ONCE
Refills: 0 | Status: DISCONTINUED | OUTPATIENT
Start: 2022-08-19 | End: 2022-08-19

## 2022-08-19 RX ADMIN — HEPARIN SODIUM 5000 UNIT(S): 5000 INJECTION INTRAVENOUS; SUBCUTANEOUS at 22:08

## 2022-08-19 RX ADMIN — OXYCODONE AND ACETAMINOPHEN 1 TABLET(S): 5; 325 TABLET ORAL at 22:37

## 2022-08-19 RX ADMIN — MEROPENEM 100 MILLIGRAM(S): 1 INJECTION INTRAVENOUS at 05:00

## 2022-08-19 RX ADMIN — HEPARIN SODIUM 5000 UNIT(S): 5000 INJECTION INTRAVENOUS; SUBCUTANEOUS at 14:43

## 2022-08-19 RX ADMIN — MORPHINE SULFATE 2 MILLIGRAM(S): 50 CAPSULE, EXTENDED RELEASE ORAL at 13:00

## 2022-08-19 RX ADMIN — Medication 250 MILLIGRAM(S): at 17:36

## 2022-08-19 RX ADMIN — Medication 250 MILLIGRAM(S): at 05:00

## 2022-08-19 RX ADMIN — OXYCODONE AND ACETAMINOPHEN 1 TABLET(S): 5; 325 TABLET ORAL at 21:37

## 2022-08-19 RX ADMIN — MORPHINE SULFATE 2 MILLIGRAM(S): 50 CAPSULE, EXTENDED RELEASE ORAL at 12:36

## 2022-08-19 RX ADMIN — HEPARIN SODIUM 5000 UNIT(S): 5000 INJECTION INTRAVENOUS; SUBCUTANEOUS at 06:51

## 2022-08-19 NOTE — DISCHARGE NOTE NURSING/CASE MANAGEMENT/SOCIAL WORK - PATIENT PORTAL LINK FT
You can access the FollowMyHealth Patient Portal offered by [facility] by registering at the following website: [email or web address]. By joining [employer]’s FollowMyHealth portal, you will also be able to view your health information using other applications (apps) compatible with our system.

## 2022-08-19 NOTE — CONSULT NOTE ADULT - ASSESSMENT
67 year old female with a h/o seasonal allergies presents from home with pain and swelling of the Rt axilla. She reports that 5-6 days ago she was scratching a freckle like lesion in the area and the next day had some pain at the site. The area became progressively more erythematous and painful and she reports fevers of 107 F at home with rigors.   s/p I and D R axillary abscess    1) Leukopenia  follows with Dr Solano   w/u has been negative in office including negative flow cytometry  WBC now normal, was 2.8 before  ? now normal WBC because of resolving infection/abscess v/s reactive leucytosis from baseline leukopenia given infection  for now monitor  outpatient f/u with Dr Solano    2) Abscess  s/p I and D as above  follow cultures  on IV abx  surgery following    3) DVT ppx
A: 68 yo F who presents with R axillary cellulitis and induration. Ct demonstrates an area of fat stranding and fluid. No palpable abscess and/or fluctuant mass to drain at bedside on exam.,     Plan:   - Admitted to medicine service   - Agree with broad spectrum IV abx   - Warm compresses   - Supportive care, anti-inflammatories   - No acute surgical intervention at this time, surgery will continue to follow         No fluctuant mass, drainable collection at this time for bedside I&D         If coalesces or forms into a definitive lesion will consider intervention     Plan discussed with on call Surgery Attending. 
66 yo F admitted for R axillary abscess s/p I&D found to have 3.4 cm thyroid nodule unbeknownst to pt.

## 2022-08-19 NOTE — CONSULT NOTE ADULT - REASON FOR ADMISSION
Cellulitis with abscess formation

## 2022-08-19 NOTE — CONSULT NOTE ADULT - NS ATTEND AMEND GEN_ALL_CORE FT
Thyroid findings d/w pt.  Need for FNA emphasized to evaluate for possible malignancy.    Pt is scheduled for discharge this weekend.  She can f/u with ma next week as outpatient and we will proceed with FNA.

## 2022-08-19 NOTE — CONSULT NOTE ADULT - PROBLEM SELECTOR RECOMMENDATION 9
-Will need US guided FNA. If primary team feels appropriate, may get biopsy while pt is in house.   -Pt should follow up with Dr. Sims Head and Neck attending [address] [facility]

## 2022-08-19 NOTE — DISCHARGE NOTE NURSING/CASE MANAGEMENT/SOCIAL WORK - NSDCPEFALRISK_GEN_ALL_CORE
For information on Fall & Injury Prevention, visit: [email or web address] OR  [email or web address] OR  https://www.cdc.gov/steadi/patient.html

## 2022-08-19 NOTE — DISCHARGE NOTE NURSING/CASE MANAGEMENT/SOCIAL WORK - NSDCVIVACCINE_GEN_ALL_CORE_FT
Tdap; 10-Apr-2015 00:53; Rahul Galvan (LINDA); [employer]; [id number]; IntraMuscular; Deltoid Left.; 0.5 milliLiter(s); VIS (VIS Published: 09-May-2013, VIS Presented: 10-Apr-2015);

## 2022-08-19 NOTE — CONSULT NOTE ADULT - SUBJECTIVE AND OBJECTIVE BOX
CC: thyroid nodule    HPI: 67 year old female with a h/o seasonal allergies admitted with pain and swelling of the Rt axilla. Pt now s/p I&D axillary abscess by surgery 8/18/22. On imaging pt was noted to have a 3.4 cm R thyroid nodule, full report below. Pt mentions people have told her her neck looks more full when talking. Denies change of voice. Feels she has been having difficulty swallowing at times. No difficulty breathing       PAST MEDICAL & SURGICAL HISTORY:  No pertinent past medical history      HTN (hypertension)      H/O: hysterectomy        Allergies    No Known Allergies    Intolerances      MEDICATIONS  (STANDING):  heparin   Injectable 5000 Unit(s) SubCutaneous every 8 hours  vancomycin  IVPB 1000 milliGRAM(s) IV Intermittent every 12 hours    MEDICATIONS  (PRN):  acetaminophen     Tablet .. 650 milliGRAM(s) Oral every 6 hours PRN Temp greater or equal to 38C (100.4F), Mild Pain (1 - 3)  aluminum hydroxide/magnesium hydroxide/simethicone Suspension 30 milliLiter(s) Oral every 4 hours PRN Dyspepsia  melatonin 3 milliGRAM(s) Oral at bedtime PRN Insomnia  morphine  - Injectable 2 milliGRAM(s) IV Push every 4 hours PRN Severe Pain (7 - 10)  ondansetron Injectable 4 milliGRAM(s) IV Push every 8 hours PRN Nausea and/or Vomiting      Social History: No reported toxic habits    Family history:   FH: breast cancer (Mother)    FH: HTN (hypertension) (Mother)        ROS:   ENT: all negative except as noted in HPI   Skin: No itching, dryness, rash, changes to hair, or skin masses  CV: denies palpitations  Pulm: denies SOB, cough, hemoptysis  GI: denies change in appetite, indigestion, n/v  : denies pertinent urinary symptoms, urgency  Neuro: denies numbness/tingling, loss of sensation  Psych: denies anxiety  MS: denies muscle weakness, instability  Heme: denies easy bruising or bleeding  Endo: denies heat/cold intolerance, excessive sweating  Vascular: denies LE edema    Vital Signs Last 24 Hrs  T(C): 36.7 (19 Aug 2022 07:44), Max: 36.8 (18 Aug 2022 21:12)  T(F): 98 (19 Aug 2022 07:44), Max: 98.2 (18 Aug 2022 21:12)  HR: 82 (19 Aug 2022 07:44) (60 - 82)  BP: 124/85 (19 Aug 2022 07:44) (117/58 - 144/72)  BP(mean): --  RR: 18 (19 Aug 2022 07:44) (18 - 19)  SpO2: 98% (19 Aug 2022 07:44) (95% - 99%)    Parameters below as of 19 Aug 2022 07:44  Patient On (Oxygen Delivery Method): room air                              12.3   6.07  )-----------( 347      ( 19 Aug 2022 07:56 )             38.0    08-19    141  |  103  |  12.8  ----------------------------<  79  4.1   |  26.0  |  0.67    Ca    9.1      19 Aug 2022 07:56         PHYSICAL EXAM:  Gen: NAD  Skin: No rashes, bruises, or lesions  Head: Normocephalic, Atraumatic  Face: no edema, erythema, or fluctuance. Parotid glands soft without mass  Eyes: no scleral injection  Nose: Nares bilaterally patent, no discharge  Mouth: No Stridor / Drooling / Trismus.  Mucosa moist, tongue/uvula midline, oropharynx clear  Neck: R anterior neck palpable fullness, soft  Lymphatic: No lymphadenopathy  Resp: breathing easily, no stridor  CV: no peripheral edema/cyanosis  GI: nondistended   Peripheral vascular: no JVD or edema  Neuro: facial nerve intact, no facial droop      IMAGING/ADDITIONAL STUDIES:   < from: CT Chest w/ IV Cont (08.15.22 @ 01:39) >    ACC: [id number] EXAM:  CT CHEST IC                          PROCEDURE DATE:  08/15/2022          INTERPRETATION:  CLINICAL INFORMATION: Axillary abscess    COMPARISON: None.    CONTRAST/COMPLICATIONS:  IV Contrast: Omnipaque 350  91 cc administered  Oral Contrast: NONE  Complications: None reported at time of study completion    PROCEDURE:  CT of the Chest was performed.  Sagittal and coronal reformats were performed.    FINDINGS:    LUNGS AND AIRWAYS: Patent central airways.  There are no confluent   airspace opacities. Mild dependent changes are seen posteriorly. Linear   scarring of the lingula is appreciated..  PLEURA: No pleural effusion.  MEDIASTINUM AND BUD: No lymphadenopathy.  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  HEART: Heart size is normal. No pericardial effusion.  CHEST WALL AND LOWER NECK: There is a 3.4 cm right thyroid nodule. Given   the size this may warrant histopathologic correlation. An additional 1 cm   left thyroid nodule is noted. These would be better evaluated with   nonemergent thyroid sonography.  VISUALIZED UPPER ABDOMEN: Within normal limits.  BONES: Within normal limits.    OTHER: Approximate 5 cm area of fat stranding and fluid is noted of the   right axilla extending along the right lateral upper chest. This is   amorphous in appearance and does not have well-defined walls. There is   associated skin thickening.      IMPRESSION:  Approximate 5 cm area of inflammatory stranding in focal fluid, which is   amorphous in morphology. There are no distinct margins or wall   enhancement appreciated. This is likely localized cellulitis versus   phlegmonous changes.    Additional findings and recommendations as above.    < end of copied text >   CC: thyroid nodule    HPI: 67 year old female with a h/o seasonal allergies admitted with pain and swelling of the Rt axilla. Pt now s/p I&D axillary abscess by surgery 8/17/22. On imaging pt was noted to have a 3.4 cm R thyroid nodule, full report below. Pt mentions people have told her her neck looks more full when talking. Denies change of voice. Feels she has been having difficulty swallowing at times. No difficulty breathing       PAST MEDICAL & SURGICAL HISTORY:  No pertinent past medical history      HTN (hypertension)      H/O: hysterectomy        Allergies    No Known Allergies    Intolerances      MEDICATIONS  (STANDING):  heparin   Injectable 5000 Unit(s) SubCutaneous every 8 hours  vancomycin  IVPB 1000 milliGRAM(s) IV Intermittent every 12 hours    MEDICATIONS  (PRN):  acetaminophen     Tablet .. 650 milliGRAM(s) Oral every 6 hours PRN Temp greater or equal to 38C (100.4F), Mild Pain (1 - 3)  aluminum hydroxide/magnesium hydroxide/simethicone Suspension 30 milliLiter(s) Oral every 4 hours PRN Dyspepsia  melatonin 3 milliGRAM(s) Oral at bedtime PRN Insomnia  morphine  - Injectable 2 milliGRAM(s) IV Push every 4 hours PRN Severe Pain (7 - 10)  ondansetron Injectable 4 milliGRAM(s) IV Push every 8 hours PRN Nausea and/or Vomiting      Social History: No reported toxic habits    Family history:   FH: breast cancer (Mother)    FH: HTN (hypertension) (Mother)        ROS:   ENT: all negative except as noted in HPI   Skin: No itching, dryness, rash, changes to hair, or skin masses  CV: denies palpitations  Pulm: denies SOB, cough, hemoptysis  GI: denies change in appetite, indigestion, n/v  : denies pertinent urinary symptoms, urgency  Neuro: denies numbness/tingling, loss of sensation  Psych: denies anxiety  MS: denies muscle weakness, instability  Heme: denies easy bruising or bleeding  Endo: denies heat/cold intolerance, excessive sweating  Vascular: denies LE edema    Vital Signs Last 24 Hrs  T(C): 36.7 (19 Aug 2022 07:44), Max: 36.8 (18 Aug 2022 21:12)  T(F): 98 (19 Aug 2022 07:44), Max: 98.2 (18 Aug 2022 21:12)  HR: 82 (19 Aug 2022 07:44) (60 - 82)  BP: 124/85 (19 Aug 2022 07:44) (117/58 - 144/72)  BP(mean): --  RR: 18 (19 Aug 2022 07:44) (18 - 19)  SpO2: 98% (19 Aug 2022 07:44) (95% - 99%)    Parameters below as of 19 Aug 2022 07:44  Patient On (Oxygen Delivery Method): room air                              12.3   6.07  )-----------( 347      ( 19 Aug 2022 07:56 )             38.0    08-19    141  |  103  |  12.8  ----------------------------<  79  4.1   |  26.0  |  0.67    Ca    9.1      19 Aug 2022 07:56         PHYSICAL EXAM:  Gen: NAD  Skin: No rashes, bruises, or lesions  Head: Normocephalic, Atraumatic  Face: no edema, erythema, or fluctuance. Parotid glands soft without mass  Eyes: no scleral injection  Nose: Nares bilaterally patent, no discharge  Mouth: No Stridor / Drooling / Trismus.  Mucosa moist, tongue/uvula midline, oropharynx clear  Neck: R anterior neck palpable fullness, soft  Lymphatic: No lymphadenopathy  Resp: breathing easily, no stridor  CV: no peripheral edema/cyanosis  GI: nondistended   Peripheral vascular: no JVD or edema  Neuro: facial nerve intact, no facial droop      IMAGING/ADDITIONAL STUDIES:   < from: CT Chest w/ IV Cont (08.15.22 @ 01:39) >    ACC: [id number] EXAM:  CT CHEST IC                          PROCEDURE DATE:  08/15/2022          INTERPRETATION:  CLINICAL INFORMATION: Axillary abscess    COMPARISON: None.    CONTRAST/COMPLICATIONS:  IV Contrast: Omnipaque 350  91 cc administered  Oral Contrast: NONE  Complications: None reported at time of study completion    PROCEDURE:  CT of the Chest was performed.  Sagittal and coronal reformats were performed.    FINDINGS:    LUNGS AND AIRWAYS: Patent central airways.  There are no confluent   airspace opacities. Mild dependent changes are seen posteriorly. Linear   scarring of the lingula is appreciated..  PLEURA: No pleural effusion.  MEDIASTINUM AND BUD: No lymphadenopathy.  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  HEART: Heart size is normal. No pericardial effusion.  CHEST WALL AND LOWER NECK: There is a 3.4 cm right thyroid nodule. Given   the size this may warrant histopathologic correlation. An additional 1 cm   left thyroid nodule is noted. These would be better evaluated with   nonemergent thyroid sonography.  VISUALIZED UPPER ABDOMEN: Within normal limits.  BONES: Within normal limits.    OTHER: Approximate 5 cm area of fat stranding and fluid is noted of the   right axilla extending along the right lateral upper chest. This is   amorphous in appearance and does not have well-defined walls. There is   associated skin thickening.      IMPRESSION:  Approximate 5 cm area of inflammatory stranding in focal fluid, which is   amorphous in morphology. There are no distinct margins or wall   enhancement appreciated. This is likely localized cellulitis versus   phlegmonous changes.    Additional findings and recommendations as above.    < end of copied text >

## 2022-08-19 NOTE — PROGRESS NOTE ADULT - ASSESSMENT
67 year old female with a h/o seasonal allergies presets from home with pain and swelling of the Rt axilla. She reports that 5-6 days ago she was scratching a freckle like lesion in the area and the next day had some pain at the site. The area became progressively more erythematous and painful and she reports fevers of 107 F at home with rigors. She states that she took some Tylenol and placed ice packs on her abd which broke her fevers. Denies prior similar problems in the area, denies recurrent cellulitis or bois. She does note that she was told by her PCP arya her WBC count was low and was seeing Dr Solano for it who ruled out leukemia/lymphoma  AS ABOVE . PT UNDERWENT I AND D AND WAS DISCHARGED HOME RETURNED WITH INCREASED PAIN AND FEVER    ON  MERREM  VANOCTO COVER MORE Broadly  s/p  SURGICAL Drainage   PRELIM STAPH AUREUS   WILL D/C MERREM  CONTINUE VANCO'  IF MSSA WOULD CHANGE TO CEFAZOLIN     WILL FOLLOW UP SPOKE TO  ASPEN

## 2022-08-19 NOTE — PROGRESS NOTE ADULT - ASSESSMENT
67 year old female with seasonal allergies presents with 6 days of fevers, rigors and worsening erythema and pain over Rt Axillary space with extension onto back.     R axilla abscess   CT Chest with approximate 5 cm area of fat stranding and fluid noted of the right axilla extending along the right lateral upper chest   s/p I&D of R axilla abscess 8/17   Blood cultures (x2) with NGTD   Surgery following, await final cultures from abscess (growing Staph aureus)   Continue Vancomycin and IV Merrem for now   MRSA PCR from nares negative   HbA1C 5.7   c/w Pain meds     Reported Leucopenia   If ja is low, would explain lack of elevated WBC and current 10.7 would be relative leukocytosis    Appreciate Heme/Onc consult   Outpt f/u with Dr Solano    Seasonal Allergies   Loratadine   Albuterol Inh PRN     Breast nodule   On palpation nodule present in LLQ (left breast)   Reports up to date with mammograms   PCP f/u reinforced     Thyroid nodules   CT showing: 3.4 cm right thyroid nodule. An additional 1 cm left thyroid nodule is noted  US thyroid as outpt for further work up     DVT ppx: Heparin SQ    67 year old female with seasonal allergies presents with 6 days of fevers, rigors and worsening erythema and pain over Rt Axillary space with extension onto back.     R axilla abscess   CT Chest with approximate 5 cm area of fat stranding and fluid noted of the right axilla extending along the right lateral upper chest   s/p I&D of R axilla abscess 8/17   Blood cultures (x2) with NGTD   Surgery following, await final cultures from abscess (growing Staph aureus)   Continue Vancomycin and DC IV Merrem today   MRSA PCR from nares negative   HbA1C 5.7   c/w Pain meds     Reported Leucopenia   If ja is low, would explain lack of elevated WBC and current 10.7 would be relative leukocytosis    Appreciate Heme/Onc consult   Outpt f/u with Dr Solano    Seasonal Allergies   Loratadine   Albuterol Inh PRN     Breast nodule   On palpation nodule present in LLQ (left breast)   Reports up to date with mammograms   PCP f/u reinforced     Thyroid nodules   CT showing: 3.4 cm right thyroid nodule. An additional 1 cm left thyroid nodule is noted  US thyroid as outpt for further work up     DVT ppx: Heparin SQ

## 2022-08-20 LAB
-  AMPICILLIN/SULBACTAM: SIGNIFICANT CHANGE UP
-  CEFAZOLIN: SIGNIFICANT CHANGE UP
-  CLINDAMYCIN: SIGNIFICANT CHANGE UP
-  DAPTOMYCIN: SIGNIFICANT CHANGE UP
-  ERYTHROMYCIN: SIGNIFICANT CHANGE UP
-  GENTAMICIN: SIGNIFICANT CHANGE UP
-  LINEZOLID: SIGNIFICANT CHANGE UP
-  OXACILLIN: SIGNIFICANT CHANGE UP
-  PENICILLIN: SIGNIFICANT CHANGE UP
-  RIFAMPIN: SIGNIFICANT CHANGE UP
-  TETRACYCLINE: SIGNIFICANT CHANGE UP
-  TRIMETHOPRIM/SULFAMETHOXAZOLE: SIGNIFICANT CHANGE UP
-  VANCOMYCIN: SIGNIFICANT CHANGE UP
ANION GAP SERPL CALC-SCNC: 11 MMOL/L — SIGNIFICANT CHANGE UP (ref 5–17)
APTT BLD: 31.1 SEC — SIGNIFICANT CHANGE UP (ref 27.5–35.5)
BASOPHILS # BLD AUTO: 0.02 K/UL — SIGNIFICANT CHANGE UP (ref 0–0.2)
BASOPHILS NFR BLD AUTO: 0.4 % — SIGNIFICANT CHANGE UP (ref 0–2)
BUN SERPL-MCNC: 13.7 MG/DL — SIGNIFICANT CHANGE UP (ref 8–20)
CALCIUM SERPL-MCNC: 9.4 MG/DL — SIGNIFICANT CHANGE UP (ref 8.4–10.5)
CHLORIDE SERPL-SCNC: 102 MMOL/L — SIGNIFICANT CHANGE UP (ref 98–107)
CO2 SERPL-SCNC: 26 MMOL/L — SIGNIFICANT CHANGE UP (ref 22–29)
CREAT SERPL-MCNC: 0.73 MG/DL — SIGNIFICANT CHANGE UP (ref 0.5–1.3)
CULTURE RESULTS: SIGNIFICANT CHANGE UP
CULTURE RESULTS: SIGNIFICANT CHANGE UP
EGFR: 91 ML/MIN/1.73M2 — SIGNIFICANT CHANGE UP
EOSINOPHIL # BLD AUTO: 0.24 K/UL — SIGNIFICANT CHANGE UP (ref 0–0.5)
EOSINOPHIL NFR BLD AUTO: 4.7 % — SIGNIFICANT CHANGE UP (ref 0–6)
GLUCOSE SERPL-MCNC: 90 MG/DL — SIGNIFICANT CHANGE UP (ref 70–99)
HCT VFR BLD CALC: 37.3 % — SIGNIFICANT CHANGE UP (ref 34.5–45)
HGB BLD-MCNC: 11.7 G/DL — SIGNIFICANT CHANGE UP (ref 11.5–15.5)
IMM GRANULOCYTES NFR BLD AUTO: 1.2 % — SIGNIFICANT CHANGE UP (ref 0–1.5)
INR BLD: 1.07 RATIO — SIGNIFICANT CHANGE UP (ref 0.88–1.16)
LYMPHOCYTES # BLD AUTO: 2.25 K/UL — SIGNIFICANT CHANGE UP (ref 1–3.3)
LYMPHOCYTES # BLD AUTO: 43.8 % — SIGNIFICANT CHANGE UP (ref 13–44)
MCHC RBC-ENTMCNC: 26.8 PG — LOW (ref 27–34)
MCHC RBC-ENTMCNC: 31.4 GM/DL — LOW (ref 32–36)
MCV RBC AUTO: 85.4 FL — SIGNIFICANT CHANGE UP (ref 80–100)
METHOD TYPE: SIGNIFICANT CHANGE UP
MONOCYTES # BLD AUTO: 0.47 K/UL — SIGNIFICANT CHANGE UP (ref 0–0.9)
MONOCYTES NFR BLD AUTO: 9.1 % — SIGNIFICANT CHANGE UP (ref 2–14)
NEUTROPHILS # BLD AUTO: 2.1 K/UL — SIGNIFICANT CHANGE UP (ref 1.8–7.4)
NEUTROPHILS NFR BLD AUTO: 40.8 % — LOW (ref 43–77)
PLATELET # BLD AUTO: 355 K/UL — SIGNIFICANT CHANGE UP (ref 150–400)
POTASSIUM SERPL-MCNC: 4.5 MMOL/L — SIGNIFICANT CHANGE UP (ref 3.5–5.3)
POTASSIUM SERPL-SCNC: 4.5 MMOL/L — SIGNIFICANT CHANGE UP (ref 3.5–5.3)
PROTHROM AB SERPL-ACNC: 12.4 SEC — SIGNIFICANT CHANGE UP (ref 10.5–13.4)
RBC # BLD: 4.37 M/UL — SIGNIFICANT CHANGE UP (ref 3.8–5.2)
RBC # FLD: 13 % — SIGNIFICANT CHANGE UP (ref 10.3–14.5)
SODIUM SERPL-SCNC: 139 MMOL/L — SIGNIFICANT CHANGE UP (ref 135–145)
SPECIMEN SOURCE: SIGNIFICANT CHANGE UP
SPECIMEN SOURCE: SIGNIFICANT CHANGE UP
VANCOMYCIN TROUGH SERPL-MCNC: 16.4 UG/ML — SIGNIFICANT CHANGE UP (ref 10–20)
WBC # BLD: 5.14 K/UL — SIGNIFICANT CHANGE UP (ref 3.8–10.5)
WBC # FLD AUTO: 5.14 K/UL — SIGNIFICANT CHANGE UP (ref 3.8–10.5)

## 2022-08-20 PROCEDURE — 99232 SBSQ HOSP IP/OBS MODERATE 35: CPT

## 2022-08-20 RX ADMIN — MORPHINE SULFATE 2 MILLIGRAM(S): 50 CAPSULE, EXTENDED RELEASE ORAL at 19:27

## 2022-08-20 RX ADMIN — MORPHINE SULFATE 2 MILLIGRAM(S): 50 CAPSULE, EXTENDED RELEASE ORAL at 12:00

## 2022-08-20 RX ADMIN — Medication 650 MILLIGRAM(S): at 15:00

## 2022-08-20 RX ADMIN — HEPARIN SODIUM 5000 UNIT(S): 5000 INJECTION INTRAVENOUS; SUBCUTANEOUS at 05:25

## 2022-08-20 RX ADMIN — HEPARIN SODIUM 5000 UNIT(S): 5000 INJECTION INTRAVENOUS; SUBCUTANEOUS at 14:08

## 2022-08-20 RX ADMIN — Medication 3 MILLIGRAM(S): at 22:11

## 2022-08-20 RX ADMIN — MORPHINE SULFATE 2 MILLIGRAM(S): 50 CAPSULE, EXTENDED RELEASE ORAL at 12:09

## 2022-08-20 RX ADMIN — Medication 650 MILLIGRAM(S): at 14:13

## 2022-08-20 RX ADMIN — Medication 250 MILLIGRAM(S): at 05:25

## 2022-08-20 RX ADMIN — HEPARIN SODIUM 5000 UNIT(S): 5000 INJECTION INTRAVENOUS; SUBCUTANEOUS at 22:09

## 2022-08-20 RX ADMIN — Medication 250 MILLIGRAM(S): at 18:29

## 2022-08-20 NOTE — PROGRESS NOTE ADULT - ASSESSMENT
67 year old female with seasonal allergies presents with 6 days of fevers, rigors and worsening erythema and pain over Rt Axillary space with extension onto back.     R axilla abscess   CT Chest with approximate 5 cm area of fat stranding and fluid noted of the right axilla extending along the right lateral upper chest   s/p I&D of R axilla abscess 8/17   Blood cultures (x2) with NGTD   Surgery following   Cultures from abscess growing MRSA   Continue Vancomycin, await final S/S for final recs  ID following   HbA1C 5.7   c/w Pain meds     Reported Leucopenia   If ja is low, would explain lack of elevated WBC and current 10.7 would be relative leukocytosis   Appreciate Heme/Onc consult   Outpt f/u with Dr Solano    Seasonal Allergies   Loratadine   Albuterol Inh PRN     Breast nodule   On palpation nodule present in LLQ (left breast)   Reports up to date with mammograms   PCP f/u reinforced     Thyroid nodules   CT showing: 3.4 cm right thyroid nodule. An additional 1 cm left thyroid nodule is noted  US thyroid as outpt for further work up   Pt should follow up with Dr. Sims Head and Neck attending [address] [facility]    DVT ppx: Heparin SQ

## 2022-08-21 LAB
ANION GAP SERPL CALC-SCNC: 12 MMOL/L — SIGNIFICANT CHANGE UP (ref 5–17)
BASOPHILS # BLD AUTO: 0.01 K/UL — SIGNIFICANT CHANGE UP (ref 0–0.2)
BASOPHILS NFR BLD AUTO: 0.2 % — SIGNIFICANT CHANGE UP (ref 0–2)
BUN SERPL-MCNC: 14.7 MG/DL — SIGNIFICANT CHANGE UP (ref 8–20)
CALCIUM SERPL-MCNC: 9.2 MG/DL — SIGNIFICANT CHANGE UP (ref 8.4–10.5)
CHLORIDE SERPL-SCNC: 104 MMOL/L — SIGNIFICANT CHANGE UP (ref 98–107)
CO2 SERPL-SCNC: 26 MMOL/L — SIGNIFICANT CHANGE UP (ref 22–29)
CREAT SERPL-MCNC: 0.66 MG/DL — SIGNIFICANT CHANGE UP (ref 0.5–1.3)
EGFR: 97 ML/MIN/1.73M2 — SIGNIFICANT CHANGE UP
EOSINOPHIL # BLD AUTO: 0.27 K/UL — SIGNIFICANT CHANGE UP (ref 0–0.5)
EOSINOPHIL NFR BLD AUTO: 5.4 % — SIGNIFICANT CHANGE UP (ref 0–6)
GLUCOSE SERPL-MCNC: 98 MG/DL — SIGNIFICANT CHANGE UP (ref 70–99)
HCT VFR BLD CALC: 35.8 % — SIGNIFICANT CHANGE UP (ref 34.5–45)
HGB BLD-MCNC: 11.4 G/DL — LOW (ref 11.5–15.5)
IMM GRANULOCYTES NFR BLD AUTO: 2 % — HIGH (ref 0–1.5)
LYMPHOCYTES # BLD AUTO: 1.86 K/UL — SIGNIFICANT CHANGE UP (ref 1–3.3)
LYMPHOCYTES # BLD AUTO: 37.1 % — SIGNIFICANT CHANGE UP (ref 13–44)
MCHC RBC-ENTMCNC: 27.4 PG — SIGNIFICANT CHANGE UP (ref 27–34)
MCHC RBC-ENTMCNC: 31.8 GM/DL — LOW (ref 32–36)
MCV RBC AUTO: 86.1 FL — SIGNIFICANT CHANGE UP (ref 80–100)
MONOCYTES # BLD AUTO: 0.53 K/UL — SIGNIFICANT CHANGE UP (ref 0–0.9)
MONOCYTES NFR BLD AUTO: 10.6 % — SIGNIFICANT CHANGE UP (ref 2–14)
NEUTROPHILS # BLD AUTO: 2.24 K/UL — SIGNIFICANT CHANGE UP (ref 1.8–7.4)
NEUTROPHILS NFR BLD AUTO: 44.7 % — SIGNIFICANT CHANGE UP (ref 43–77)
PLATELET # BLD AUTO: 336 K/UL — SIGNIFICANT CHANGE UP (ref 150–400)
POTASSIUM SERPL-MCNC: 4.4 MMOL/L — SIGNIFICANT CHANGE UP (ref 3.5–5.3)
POTASSIUM SERPL-SCNC: 4.4 MMOL/L — SIGNIFICANT CHANGE UP (ref 3.5–5.3)
RBC # BLD: 4.16 M/UL — SIGNIFICANT CHANGE UP (ref 3.8–5.2)
RBC # FLD: 12.8 % — SIGNIFICANT CHANGE UP (ref 10.3–14.5)
SODIUM SERPL-SCNC: 141 MMOL/L — SIGNIFICANT CHANGE UP (ref 135–145)
WBC # BLD: 5.01 K/UL — SIGNIFICANT CHANGE UP (ref 3.8–10.5)
WBC # FLD AUTO: 5.01 K/UL — SIGNIFICANT CHANGE UP (ref 3.8–10.5)

## 2022-08-21 PROCEDURE — 99232 SBSQ HOSP IP/OBS MODERATE 35: CPT

## 2022-08-21 RX ADMIN — HEPARIN SODIUM 5000 UNIT(S): 5000 INJECTION INTRAVENOUS; SUBCUTANEOUS at 21:55

## 2022-08-21 RX ADMIN — Medication 250 MILLIGRAM(S): at 07:07

## 2022-08-21 RX ADMIN — Medication 250 MILLIGRAM(S): at 18:09

## 2022-08-21 RX ADMIN — Medication 3 MILLIGRAM(S): at 21:55

## 2022-08-21 RX ADMIN — MORPHINE SULFATE 2 MILLIGRAM(S): 50 CAPSULE, EXTENDED RELEASE ORAL at 10:11

## 2022-08-21 RX ADMIN — HEPARIN SODIUM 5000 UNIT(S): 5000 INJECTION INTRAVENOUS; SUBCUTANEOUS at 07:07

## 2022-08-21 RX ADMIN — HEPARIN SODIUM 5000 UNIT(S): 5000 INJECTION INTRAVENOUS; SUBCUTANEOUS at 14:09

## 2022-08-21 RX ADMIN — MORPHINE SULFATE 2 MILLIGRAM(S): 50 CAPSULE, EXTENDED RELEASE ORAL at 20:47

## 2022-08-21 RX ADMIN — MORPHINE SULFATE 2 MILLIGRAM(S): 50 CAPSULE, EXTENDED RELEASE ORAL at 10:00

## 2022-08-21 RX ADMIN — MORPHINE SULFATE 2 MILLIGRAM(S): 50 CAPSULE, EXTENDED RELEASE ORAL at 21:15

## 2022-08-21 NOTE — PROGRESS NOTE ADULT - ASSESSMENT
A: Patient is a 66yo F POD#3 right axillary I&D doing well.     Plan:   -Continue care per primary  Pain control  - DVT ppx, encourage OOB as tolerated  - Encourage IS  - Dressing chages BID

## 2022-08-21 NOTE — PHARMACOTHERAPY INTERVENTION NOTE - NSPHARMCOMMASP
ASP - Dose optimization/Non-Renal dose adjustment
ASP - Lab/ test recommended
ASP - Lab/ test recommended

## 2022-08-21 NOTE — PROGRESS NOTE ADULT - ASSESSMENT
67 year old female with seasonal allergies presents with 6 days of fevers, rigors and worsening erythema and pain over Rt Axillary space with extension onto back.     R axilla abscess   CT Chest with approximate 5 cm area of fat stranding and fluid noted of the right axilla extending along the right lateral upper chest   s/p I&D of R axilla abscess 8/17   Blood cultures (x2) with NGTD   Surgery following, c/w BID dressing changes   Cultures from abscess growing MRSA   Continue Vancomycin, await final S/S for final recs   ID following, will discuss IV vs oral antibiotic course   HbA1C 5.7   c/w Pain meds     Reported Leucopenia   If ja is low, current levels would be relative leukocytosis   Appreciate Heme/Onc consult   Outpt f/u with Dr Solano    Seasonal Allergies   Loratadine   Albuterol Inh PRN     Breast nodule   On palpation nodule present in LLQ (left breast)   Reports up to date with mammograms   PCP f/u reinforced     Thyroid nodules   CT showing: 3.4 cm right thyroid nodule. An additional 1 cm left thyroid nodule is noted  US thyroid as outpt for further work up   Pt should follow up with Dr. Sims Head and Neck attending [address] [facility]    DVT ppx: Heparin SQ

## 2022-08-22 ENCOUNTER — TRANSCRIPTION ENCOUNTER (OUTPATIENT)
Age: 67
End: 2022-08-22

## 2022-08-22 VITALS
HEART RATE: 56 BPM | DIASTOLIC BLOOD PRESSURE: 67 MMHG | OXYGEN SATURATION: 95 % | RESPIRATION RATE: 18 BRPM | SYSTOLIC BLOOD PRESSURE: 104 MMHG | TEMPERATURE: 98 F

## 2022-08-22 LAB
ANION GAP SERPL CALC-SCNC: 9 MMOL/L — SIGNIFICANT CHANGE UP (ref 5–17)
BASOPHILS # BLD AUTO: 0.02 K/UL — SIGNIFICANT CHANGE UP (ref 0–0.2)
BASOPHILS NFR BLD AUTO: 0.4 % — SIGNIFICANT CHANGE UP (ref 0–2)
BUN SERPL-MCNC: 13 MG/DL — SIGNIFICANT CHANGE UP (ref 8–20)
CALCIUM SERPL-MCNC: 9.5 MG/DL — SIGNIFICANT CHANGE UP (ref 8.4–10.5)
CHLORIDE SERPL-SCNC: 101 MMOL/L — SIGNIFICANT CHANGE UP (ref 98–107)
CO2 SERPL-SCNC: 28 MMOL/L — SIGNIFICANT CHANGE UP (ref 22–29)
CREAT SERPL-MCNC: 0.77 MG/DL — SIGNIFICANT CHANGE UP (ref 0.5–1.3)
CULTURE RESULTS: SIGNIFICANT CHANGE UP
EGFR: 86 ML/MIN/1.73M2 — SIGNIFICANT CHANGE UP
EOSINOPHIL # BLD AUTO: 0.33 K/UL — SIGNIFICANT CHANGE UP (ref 0–0.5)
EOSINOPHIL NFR BLD AUTO: 6.3 % — HIGH (ref 0–6)
GLUCOSE SERPL-MCNC: 103 MG/DL — HIGH (ref 70–99)
HCT VFR BLD CALC: 35.5 % — SIGNIFICANT CHANGE UP (ref 34.5–45)
HGB BLD-MCNC: 11.3 G/DL — LOW (ref 11.5–15.5)
IMM GRANULOCYTES NFR BLD AUTO: 1.3 % — SIGNIFICANT CHANGE UP (ref 0–1.5)
LYMPHOCYTES # BLD AUTO: 2.13 K/UL — SIGNIFICANT CHANGE UP (ref 1–3.3)
LYMPHOCYTES # BLD AUTO: 40.9 % — SIGNIFICANT CHANGE UP (ref 13–44)
MCHC RBC-ENTMCNC: 26.9 PG — LOW (ref 27–34)
MCHC RBC-ENTMCNC: 31.8 GM/DL — LOW (ref 32–36)
MCV RBC AUTO: 84.5 FL — SIGNIFICANT CHANGE UP (ref 80–100)
MONOCYTES # BLD AUTO: 0.62 K/UL — SIGNIFICANT CHANGE UP (ref 0–0.9)
MONOCYTES NFR BLD AUTO: 11.9 % — SIGNIFICANT CHANGE UP (ref 2–14)
NEUTROPHILS # BLD AUTO: 2.04 K/UL — SIGNIFICANT CHANGE UP (ref 1.8–7.4)
NEUTROPHILS NFR BLD AUTO: 39.2 % — LOW (ref 43–77)
ORGANISM # SPEC MICROSCOPIC CNT: SIGNIFICANT CHANGE UP
ORGANISM # SPEC MICROSCOPIC CNT: SIGNIFICANT CHANGE UP
PLATELET # BLD AUTO: 343 K/UL — SIGNIFICANT CHANGE UP (ref 150–400)
POTASSIUM SERPL-MCNC: 4.4 MMOL/L — SIGNIFICANT CHANGE UP (ref 3.5–5.3)
POTASSIUM SERPL-SCNC: 4.4 MMOL/L — SIGNIFICANT CHANGE UP (ref 3.5–5.3)
RBC # BLD: 4.2 M/UL — SIGNIFICANT CHANGE UP (ref 3.8–5.2)
RBC # FLD: 13 % — SIGNIFICANT CHANGE UP (ref 10.3–14.5)
SARS-COV-2 RNA SPEC QL NAA+PROBE: SIGNIFICANT CHANGE UP
SODIUM SERPL-SCNC: 138 MMOL/L — SIGNIFICANT CHANGE UP (ref 135–145)
SPECIMEN SOURCE: SIGNIFICANT CHANGE UP
VANCOMYCIN TROUGH SERPL-MCNC: 15.3 UG/ML — SIGNIFICANT CHANGE UP (ref 10–20)
WBC # BLD: 5.21 K/UL — SIGNIFICANT CHANGE UP (ref 3.8–10.5)
WBC # FLD AUTO: 5.21 K/UL — SIGNIFICANT CHANGE UP (ref 3.8–10.5)

## 2022-08-22 PROCEDURE — 96375 TX/PRO/DX INJ NEW DRUG ADDON: CPT

## 2022-08-22 PROCEDURE — 86901 BLOOD TYPING SEROLOGIC RH(D): CPT

## 2022-08-22 PROCEDURE — 87040 BLOOD CULTURE FOR BACTERIA: CPT

## 2022-08-22 PROCEDURE — 99285 EMERGENCY DEPT VISIT HI MDM: CPT

## 2022-08-22 PROCEDURE — 80048 BASIC METABOLIC PNL TOTAL CA: CPT

## 2022-08-22 PROCEDURE — 99232 SBSQ HOSP IP/OBS MODERATE 35: CPT

## 2022-08-22 PROCEDURE — 80053 COMPREHEN METABOLIC PANEL: CPT

## 2022-08-22 PROCEDURE — 96367 TX/PROPH/DG ADDL SEQ IV INF: CPT

## 2022-08-22 PROCEDURE — 87640 STAPH A DNA AMP PROBE: CPT

## 2022-08-22 PROCEDURE — 71260 CT THORAX DX C+: CPT | Mod: MA

## 2022-08-22 PROCEDURE — 99239 HOSP IP/OBS DSCHRG MGMT >30: CPT

## 2022-08-22 PROCEDURE — 10060 I&D ABSCESS SIMPLE/SINGLE: CPT

## 2022-08-22 PROCEDURE — 83605 ASSAY OF LACTIC ACID: CPT

## 2022-08-22 PROCEDURE — 36415 COLL VENOUS BLD VENIPUNCTURE: CPT

## 2022-08-22 PROCEDURE — 93971 EXTREMITY STUDY: CPT

## 2022-08-22 PROCEDURE — U0003: CPT

## 2022-08-22 PROCEDURE — 87205 SMEAR GRAM STAIN: CPT

## 2022-08-22 PROCEDURE — 99283 EMERGENCY DEPT VISIT LOW MDM: CPT | Mod: 25

## 2022-08-22 PROCEDURE — 87186 SC STD MICRODIL/AGAR DIL: CPT

## 2022-08-22 PROCEDURE — 96365 THER/PROPH/DIAG IV INF INIT: CPT

## 2022-08-22 PROCEDURE — 87075 CULTR BACTERIA EXCEPT BLOOD: CPT

## 2022-08-22 PROCEDURE — 85730 THROMBOPLASTIN TIME PARTIAL: CPT

## 2022-08-22 PROCEDURE — 0225U NFCT DS DNA&RNA 21 SARSCOV2: CPT

## 2022-08-22 PROCEDURE — 87070 CULTURE OTHR SPECIMN AEROBIC: CPT

## 2022-08-22 PROCEDURE — 85610 PROTHROMBIN TIME: CPT

## 2022-08-22 PROCEDURE — 96376 TX/PRO/DX INJ SAME DRUG ADON: CPT

## 2022-08-22 PROCEDURE — 80202 ASSAY OF VANCOMYCIN: CPT

## 2022-08-22 PROCEDURE — 86900 BLOOD TYPING SEROLOGIC ABO: CPT

## 2022-08-22 PROCEDURE — 83036 HEMOGLOBIN GLYCOSYLATED A1C: CPT

## 2022-08-22 PROCEDURE — 86803 HEPATITIS C AB TEST: CPT

## 2022-08-22 PROCEDURE — U0005: CPT

## 2022-08-22 PROCEDURE — 86850 RBC ANTIBODY SCREEN: CPT

## 2022-08-22 PROCEDURE — G0378: CPT

## 2022-08-22 PROCEDURE — 85025 COMPLETE CBC W/AUTO DIFF WBC: CPT

## 2022-08-22 PROCEDURE — 87102 FUNGUS ISOLATION CULTURE: CPT

## 2022-08-22 PROCEDURE — 87641 MR-STAPH DNA AMP PROBE: CPT

## 2022-08-22 RX ORDER — TRAMADOL HYDROCHLORIDE 50 MG/1
1 TABLET ORAL
Qty: 12 | Refills: 0
Start: 2022-08-22 | End: 2022-08-24

## 2022-08-22 RX ORDER — AZTREONAM 2 G
1 VIAL (EA) INJECTION
Qty: 14 | Refills: 0
Start: 2022-08-22 | End: 2022-08-28

## 2022-08-22 RX ADMIN — MORPHINE SULFATE 2 MILLIGRAM(S): 50 CAPSULE, EXTENDED RELEASE ORAL at 06:55

## 2022-08-22 RX ADMIN — HEPARIN SODIUM 5000 UNIT(S): 5000 INJECTION INTRAVENOUS; SUBCUTANEOUS at 05:42

## 2022-08-22 RX ADMIN — MORPHINE SULFATE 2 MILLIGRAM(S): 50 CAPSULE, EXTENDED RELEASE ORAL at 10:41

## 2022-08-22 RX ADMIN — MORPHINE SULFATE 2 MILLIGRAM(S): 50 CAPSULE, EXTENDED RELEASE ORAL at 10:26

## 2022-08-22 RX ADMIN — Medication 250 MILLIGRAM(S): at 06:42

## 2022-08-22 NOTE — CHART NOTE - NSCHARTNOTEFT_GEN_A_CORE
Okay to discharge per surgery. Daily dressing changes with wet to dry packing and gauze on top. F/u with Dr. Roosevelt Flores in clinic in within 2 weeks.
Post-op Check    Subjective:  Pt offers no acute complaints at this time. Pain well controlled on current regiment. Denies nausea, vomiting, chest pain, SOB, palpitations.     STATUS POST:  I & D of r axilla abscess    POST OPERATIVE DAY #: 0    MEDICATIONS  (STANDING):  meropenem  IVPB 1000 milliGRAM(s) IV Intermittent every 8 hours  vancomycin  IVPB 1000 milliGRAM(s) IV Intermittent every 12 hours    MEDICATIONS  (PRN):  acetaminophen     Tablet .. 650 milliGRAM(s) Oral every 6 hours PRN Temp greater or equal to 38C (100.4F), Mild Pain (1 - 3)  aluminum hydroxide/magnesium hydroxide/simethicone Suspension 30 milliLiter(s) Oral every 4 hours PRN Dyspepsia  melatonin 3 milliGRAM(s) Oral at bedtime PRN Insomnia  ondansetron Injectable 4 milliGRAM(s) IV Push every 8 hours PRN Nausea and/or Vomiting      Vital Signs Last 24 Hrs  T(C): 36.4 (17 Aug 2022 20:00), Max: 36.8 (17 Aug 2022 17:55)  T(F): 97.6 (17 Aug 2022 20:00), Max: 98.2 (17 Aug 2022 17:55)  HR: 96 (17 Aug 2022 20:00) (58 - 96)  BP: 150/77 (17 Aug 2022 20:00) (113/81 - 156/74)  BP(mean): 86 (17 Aug 2022 18:10) (86 - 96)  RR: 19 (17 Aug 2022 20:00) (12 - 21)  SpO2: 96% (17 Aug 2022 20:00) (95% - 99%)    Parameters below as of 17 Aug 2022 20:00  Patient On (Oxygen Delivery Method): room air        Physical Exam:    General: Laying comfortably in bed, NAD  Respiratory: no accessory muscle use, respirations non-labored  Chest: clean and dry dressing covering incision site  Abdomen: soft, nontender  Neurological: A&O x 3; without gross deficit    A: The patient is doing well post op. Pain is being controlled with current regimen and does not have signs of hemodynamic instability.    P:  Continue current care  Pain control  OOB as tolerated  Encourage IS  DVT ppx
Called by RN to eval pain and swelling in L arm.  Patient seen and examined, in NAD. Patient states that she has pain in R armpit and in L antecubital fossa when touched.  Left arm with redness, swelling and hardened area in antecubital fossa.  Doppler of JEANE ordered.  < from: US Duplex Venous Upper Ext Ltd, Left (08.19.22 @ 22:52) >    IMPRESSION:  No evidence of left upper extremity deep venous thrombosis.  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    < end of copied text >    Warm compress ordered.  Continue to monitor patient, notify PA of any changes in patient status.

## 2022-08-22 NOTE — DISCHARGE NOTE PROVIDER - CARE PROVIDER_API CALL
Roosevelt Flores)  Surgery  [address] [place] [address]  Phone: [phone]  Fax: [phone]  Follow Up Time:     Darrell Cuevas)  Infectious Disease; Internal Medicine  [address] [address]  Phone: [phone]  Fax: [phone]  Follow Up Time:     Remi Sims)  Otolaryngology  [address] [place] [address]  Phone: [phone]  Fax: [phone]  Follow Up Time:

## 2022-08-22 NOTE — DISCHARGE NOTE PROVIDER - NSDCMRMEDTOKEN_GEN_ALL_CORE_FT
aluminum hydroxide-magnesium hydroxide 200 mg-200 mg/5 mL oral suspension: 30 milliliter(s) orally every 12 hours  aspirin 81 mg oral delayed release tablet: 1 tab(s) orally once a day  Claritin-D 12 Hour oral tablet, extended release: 1 tab(s) orally every 12 hours   meclizine 25 mg oral tablet: 1 tab(s) orally every 8 hours, As Needed -for dizziness  pantoprazole 40 mg oral delayed release tablet: 1 tab(s) orally once a day   Proventil HFA 90 mcg/inh inhalation aerosol: 2 puff(s) inhaled 4 times a day    aluminum hydroxide-magnesium hydroxide 200 mg-200 mg/5 mL oral suspension: 30 milliliter(s) orally every 12 hours  aspirin 81 mg oral delayed release tablet: 1 tab(s) orally once a day  Bactrim  mg-160 mg oral tablet: 1 tab(s) orally every 12 hours   Claritin-D 12 Hour oral tablet, extended release: 1 tab(s) orally every 12 hours   meclizine 25 mg oral tablet: 1 tab(s) orally every 8 hours, As Needed -for dizziness  pantoprazole 40 mg oral delayed release tablet: 1 tab(s) orally once a day   Proventil HFA 90 mcg/inh inhalation aerosol: 2 puff(s) inhaled 4 times a day    aluminum hydroxide-magnesium hydroxide 200 mg-200 mg/5 mL oral suspension: 30 milliliter(s) orally every 12 hours  aspirin 81 mg oral delayed release tablet: 1 tab(s) orally once a day  Bactrim  mg-160 mg oral tablet: 1 tab(s) orally every 12 hours   Claritin-D 12 Hour oral tablet, extended release: 1 tab(s) orally every 12 hours   meclizine 25 mg oral tablet: 1 tab(s) orally every 8 hours, As Needed -for dizziness  pantoprazole 40 mg oral delayed release tablet: 1 tab(s) orally once a day   Proventil HFA 90 mcg/inh inhalation aerosol: 2 puff(s) inhaled 4 times a day   traMADol 50 mg oral tablet: 1 tab(s) orally every 6 hours, As Needed MDD:4tabs

## 2022-08-22 NOTE — PROGRESS NOTE ADULT - ASSESSMENT
67 year old female with a h/o seasonal allergies presets from home with pain and swelling of the Rt axilla. She reports that 5-6 days ago she was scratching a freckle like lesion in the area and the next day had some pain at the site. The area became progressively more erythematous and painful and she reports fevers of 107 F at home with rigors. She states that she took some Tylenol and placed ice packs on her abd which broke her fevers. Denies prior similar problems in the area, denies recurrent cellulitis or bois. She does note that she was told by her PCP arya her WBC count was low and was seeing Dr Solano for it who ruled out leukemia/lymphoma  AS ABOVE . PT UNDERWENT I AND D AND WAS DISCHARGED HOME RETURNED WITH INCREASED PAIN AND FEVER    s/p  SURGICAL Drainage   STAPH AUREUS  MRSA   ON  VANCO    OVERALL IMPROVED  OK TO DISCHARGE HOME ON ORAL BACTRIM 1 DS BID  NEEDS WOUND CARE  DAILY  WITH PACKING AS PER SURGERY WILL FOLLOWUP   AS NEEDED PLEASE CALL IF QUESTIONS

## 2022-08-22 NOTE — PROGRESS NOTE ADULT - REASON FOR ADMISSION
Cellulitis with abscess formation

## 2022-08-22 NOTE — DISCHARGE NOTE PROVIDER - CARE PROVIDERS DIRECT ADDRESSES
,[email or web address]_Unknown,[email or web address]

## 2022-08-22 NOTE — PROGRESS NOTE ADULT - PROVIDER SPECIALTY LIST ADULT
Infectious Disease
Surgery
Hospitalist
Surgery
Surgery
Anesthesia
Hospitalist
Surgery
Hospitalist
Infectious Disease
Infectious Disease

## 2022-08-22 NOTE — PROGRESS NOTE ADULT - SUBJECTIVE AND OBJECTIVE BOX
CC: Cellulitis with abscess formation (16 Aug 2022 00:28)    HPI:  67 year old female with a h/o seasonal allergies presents from home with pain and swelling of the Rt axilla. She reports that 5-6 days ago she was scratching a freckle like lesion in the area and the next day had some pain at the site. The area became progressively more erythematous and painful and she reports fevers of 107 F at home with rigors. She states that she took some Tylenol and placed ice packs on her abd which broke her fevers.    INTERVAL HPI/OVERNIGHT EVENTS: Patient seen and examined lying in bed.  Patient reports a lot of pain at right axilla area with increased swelling.  Patient denies any headache, dizziness, SOB, CP, abdominal pain, nausea, vomiting, dysuria.  Other ROS reviewed and are negative.    Vital Signs Last 24 Hrs  T(C): 36.7 (16 Aug 2022 07:20), Max: 37.2 (15 Aug 2022 15:06)  T(F): 98.1 (16 Aug 2022 07:20), Max: 99 (15 Aug 2022 15:06)  HR: 65 (16 Aug 2022 07:20) (59 - 70)  BP: 127/76 (16 Aug 2022 07:20) (105/65 - 134/80)  BP(mean): 98 (15 Aug 2022 20:10) (79 - 98)  RR: 18 (16 Aug 2022 07:20) (18 - 19)  SpO2: 96% (16 Aug 2022 07:20) (96% - 99%)    Parameters below as of 16 Aug 2022 07:20  Patient On (Oxygen Delivery Method): room air      I&O's Detail      PHYSICAL EXAM:  GENERAL: NAD  HEAD:  Atraumatic, Normocephalic  NECK: Supple, No JVD, Normal thyroid  NERVOUS SYSTEM:  Alert & Oriented X3, Good concentration; Motor Strength 5/5 B/L upper and lower extremities  CHEST/LUNG: Clear to auscultation bilaterally  HEART: Regular rate and rhythm; No murmurs, rubs, or gallops  ABDOMEN: Soft, Nontender, Nondistended; Bowel sounds present  EXTREMITIES:  2+ Peripheral Pulses, No clubbing, cyanosis, or edema  SKIN: Right axilla with swelling and tenderness                            10.7   6.25  )-----------( 278      ( 16 Aug 2022 04:16 )             32.8     16 Aug 2022 04:16    142    |  104    |  14.7   ----------------------------<  112    4.6     |  28.0   |  0.85     Ca    9.0        16 Aug 2022 04:16    TPro  6.8    /  Alb  3.9    /  TBili  <0.2   /  DBili  x      /  AST  23     /  ALT  24     /  AlkPhos  73     14 Aug 2022 21:29    PT/INR - ( 14 Aug 2022 21:29 )   PT: 13.1 sec;   INR: 1.13 ratio         PTT - ( 14 Aug 2022 21:29 )  PTT:28.0 sec        LIVER FUNCTIONS - ( 14 Aug 2022 21:29 )  Alb: 3.9 g/dL / Pro: 6.8 g/dL / ALK PHOS: 73 U/L / ALT: 24 U/L / AST: 23 U/L / GGT: x               MEDICATIONS  (STANDING):  aspirin enteric coated 81 milliGRAM(s) Oral daily  cefTRIAXone   IVPB 2000 milliGRAM(s) IV Intermittent every 24 hours  heparin   Injectable 5000 Unit(s) SubCutaneous every 12 hours  loratadine 10 milliGRAM(s) Oral daily  pantoprazole    Tablet 40 milliGRAM(s) Oral before breakfast  vancomycin  IVPB 1250 milliGRAM(s) IV Intermittent every 12 hours  vancomycin  IVPB        MEDICATIONS  (PRN):  acetaminophen     Tablet .. 650 milliGRAM(s) Oral every 6 hours PRN Temp greater or equal to 38C (100.4F), Mild Pain (1 - 3)  ALBUTerol    90 MICROgram(s) HFA Inhaler 2 Puff(s) Inhalation every 6 hours PRN Shortness of Breath  aluminum hydroxide/magnesium hydroxide/simethicone Suspension 30 milliLiter(s) Oral every 4 hours PRN Dyspepsia  ketorolac   Injectable 30 milliGRAM(s) IV Push every 8 hours PRN Moderate Pain (4 - 6)  melatonin 3 milliGRAM(s) Oral at bedtime PRN Insomnia  morphine  - Injectable 2 milliGRAM(s) IV Push every 4 hours PRN Moderate Pain (4 - 6)  morphine  - Injectable 4 milliGRAM(s) IV Push every 6 hours PRN Severe Pain (7 - 10)  ondansetron Injectable 4 milliGRAM(s) IV Push every 8 hours PRN Nausea and/or Vomiting      RADIOLOGY & ADDITIONAL TESTS:  < from: CT Chest w/ IV Cont (08.15.22 @ 01:39) >  ACC: [id number] EXAM:  CT CHEST IC                          PROCEDURE DATE:  08/15/2022          INTERPRETATION:  CLINICAL INFORMATION: Axillary abscess    COMPARISON: None.    CONTRAST/COMPLICATIONS:  IV Contrast: Omnipaque 350  91 cc administered  Oral Contrast: NONE  Complications: None reported at time of study completion    PROCEDURE:  CT of the Chest was performed.  Sagittal and coronal reformats were performed.    FINDINGS:    LUNGS AND AIRWAYS: Patent central airways.  There are no confluent   airspace opacities. Mild dependent changes are seen posteriorly. Linear   scarring of the lingula is appreciated..  PLEURA: No pleural effusion.  MEDIASTINUM AND BUD: No lymphadenopathy.  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  HEART: Heart size is normal. No pericardial effusion.  CHEST WALL AND LOWER NECK: There is a 3.4 cm right thyroid nodule. Given   the size this may warrant histopathologic correlation. An additional 1 cm   left thyroid nodule is noted. These would be better evaluated with   nonemergent thyroid sonography.  VISUALIZED UPPER ABDOMEN: Within normal limits.  BONES: Within normal limits.    OTHER: Approximate 5 cm area of fat stranding and fluid is noted of the   right axilla extending along the right lateral upper chest. This is   amorphous in appearance and does not have well-defined walls. There is   associated skin thickening.      IMPRESSION:  Approximate 5 cm area of inflammatory stranding in focal fluid, which is   amorphous in morphology. There are no distinct margins or wall   enhancement appreciated. This is likely localized cellulitis versus   phlegmonous changes.    Additional findings and recommendations as above.            --- End of Report ---            VIKKI GARZA MD; Attending Radiologist  This document has been electronically signed. Aug 15 2022  2:06AM    < end of copied text >  
CC: Cellulitis with abscess formation (17 Aug 2022 09:50)    HPI:  67 year old female with a h/o seasonal allergies presents from home with pain and swelling of the Rt axilla. She reports that 5-6 days ago she was scratching a freckle like lesion in the area and the next day had some pain at the site. The area became progressively more erythematous and painful and she reports fevers of 107 F at home with rigors. She states that she took some Tylenol and placed ice packs on her abd which broke her fevers.    INTERVAL HPI/OVERNIGHT EVENTS: Patient seen and examined lying in bed.  Patient reports discomfort in right axilla, controlled with Morphine.  Patient denies any dizziness, SOB, CP, abdominal pain, nausea, vomiting, dysuria.  Other ROS reviewed and are negative.    Vital Signs Last 24 Hrs  T(C): 36.6 (17 Aug 2022 07:36), Max: 36.9 (16 Aug 2022 15:53)  T(F): 97.9 (17 Aug 2022 07:36), Max: 98.5 (17 Aug 2022 04:47)  HR: 62 (17 Aug 2022 07:36) (62 - 71)  BP: 125/75 (17 Aug 2022 10:30) (103/67 - 129/82)  BP(mean): --  RR: 20 (17 Aug 2022 07:36) (17 - 20)  SpO2: 97% (17 Aug 2022 07:36) (97% - 98%)    Parameters below as of 17 Aug 2022 07:36  Patient On (Oxygen Delivery Method): room air      I&O's Detail      PHYSICAL EXAM:  GENERAL: NAD, well-groomed, well-developed  HEAD:  Atraumatic, Normocephalic  NECK: Supple, No JVD, Normal thyroid  NERVOUS SYSTEM:  Alert & Oriented X3, Good concentration; Motor Strength 5/5 B/L upper and lower extremities  CHEST/LUNG: Clear to auscultation bilaterally  HEART: Regular rate and rhythm; No murmurs, rubs, or gallops  ABDOMEN: Soft, Nontender, Nondistended; Bowel sounds present  EXTREMITIES:  2+ Peripheral Pulses, No clubbing, cyanosis, or edema  SKIN: Right axilla with swelling and tenderness                            10.8   4.72  )-----------( 308      ( 17 Aug 2022 04:42 )             33.3     17 Aug 2022 04:42    139    |  103    |  12.4   ----------------------------<  101    4.3     |  27.0   |  0.72     Ca    8.7        17 Aug 2022 04:42    Culture - Blood (08.15.22 @ 12:10)    Specimen Source: .Blood Blood-Peripheral    Culture Results:   No growth to date.    Culture - Blood (08.15.22 @ 12:10)    Specimen Source: .Blood Blood-Peripheral    Culture Results:   No growth to date.          MEDICATIONS  (STANDING):  aspirin enteric coated 81 milliGRAM(s) Oral daily  heparin   Injectable 5000 Unit(s) SubCutaneous every 12 hours  lactated ringers. 1000 milliLiter(s) (120 mL/Hr) IV Continuous <Continuous>  loratadine 10 milliGRAM(s) Oral daily  meropenem  IVPB 1000 milliGRAM(s) IV Intermittent every 8 hours  pantoprazole    Tablet 40 milliGRAM(s) Oral before breakfast  vancomycin  IVPB 1000 milliGRAM(s) IV Intermittent every 12 hours    MEDICATIONS  (PRN):  acetaminophen     Tablet .. 650 milliGRAM(s) Oral every 6 hours PRN Temp greater or equal to 38C (100.4F), Mild Pain (1 - 3)  ALBUTerol    90 MICROgram(s) HFA Inhaler 2 Puff(s) Inhalation every 6 hours PRN Shortness of Breath  aluminum hydroxide/magnesium hydroxide/simethicone Suspension 30 milliLiter(s) Oral every 4 hours PRN Dyspepsia  ketorolac   Injectable 30 milliGRAM(s) IV Push every 8 hours PRN Moderate Pain (4 - 6)  melatonin 3 milliGRAM(s) Oral at bedtime PRN Insomnia  morphine  - Injectable 2 milliGRAM(s) IV Push every 4 hours PRN Moderate Pain (4 - 6)  morphine  - Injectable 4 milliGRAM(s) IV Push every 6 hours PRN Severe Pain (7 - 10)  ondansetron Injectable 4 milliGRAM(s) IV Push every 8 hours PRN Nausea and/or Vomiting        
[facility] Division of Hospital Medicine     Chief Complaint:  Cellulitis with abscess     SUBJECTIVE / OVERNIGHT EVENTS:   Pt reports she feels she has a lot of pain today     Patient denies chest pain, SOB, abd pain, N/V, fever, chills, dysuria or any other complaints. All remainder ROS negative.     MEDICATIONS  (STANDING):   heparin   Injectable 5000 Unit(s) SubCutaneous every 8 hours  vancomycin  IVPB 1000 milliGRAM(s) IV Intermittent every 12 hours    MEDICATIONS  (PRN):   acetaminophen     Tablet .. 650 milliGRAM(s) Oral every 6 hours PRN Temp greater or equal to 38C (100.4F), Mild Pain (1 - 3)  aluminum hydroxide/magnesium hydroxide/simethicone Suspension 30 milliLiter(s) Oral every 4 hours PRN Dyspepsia  melatonin 3 milliGRAM(s) Oral at bedtime PRN Insomnia  morphine  - Injectable 2 milliGRAM(s) IV Push every 4 hours PRN Severe Pain (7 - 10)  ondansetron Injectable 4 milliGRAM(s) IV Push every 8 hours PRN Nausea and/or Vomiting        I&O's Summary      PHYSICAL EXAM:   Vital Signs Last 24 Hrs  T(C): 36.9 (21 Aug 2022 08:30), Max: 36.9 (20 Aug 2022 16:25)  T(F): 98.4 (21 Aug 2022 08:30), Max: 98.4 (20 Aug 2022 16:25)  HR: 62 (21 Aug 2022 08:30) (62 - 68)  BP: 108/66 (21 Aug 2022 08:30) (104/64 - 108/66)  BP(mean): --  RR: 18 (21 Aug 2022 08:30) (18 - 18)  SpO2: 96% (21 Aug 2022 08:30) (95% - 96%)    Parameters below as of 21 Aug 2022 08:30  Patient On (Oxygen Delivery Method): room air        CONSTITUTIONAL: NAD, sitting up in bed   ENMT: Moist oral mucosa, EOMI   RESPIRATORY: Normal respiratory effort; lungs are clear to auscultation bilaterally   CARDIOVASCULAR: Regular rate and rhythm, normal S1 and S2, No lower extremity edema  ABDOMEN: Nontender to palpation, normoactive bowel sounds  MUSCULOSKELETAL:  No clubbing or cyanosis of digits   PSYCH: A+O to person, place, and time; affect appropriate  NEUROLOGY: No gross sensory or motor deficits   SKIN: R axilla with dressing in place dry, clean, improved swelling       LABS:                        11.4   5.01  )-----------( 336      ( 21 Aug 2022 07:00 )             35.8     08-21    141  |  104  |  14.7  ----------------------------<  98  4.4   |  26.0  |  0.66    Ca    9.2      21 Aug 2022 07:00      PT/INR - ( 20 Aug 2022 05:11 )   PT: 12.4 sec;   INR: 1.07 ratio         PTT - ( 20 Aug 2022 05:11 )  PTT:31.1 sec  
[facility] Division of Hospital Medicine    Chief Complaint:  Cellulitis with abscess     SUBJECTIVE / OVERNIGHT EVENTS:   Pt reports she feels better   Swelling and pain are improving     Patient denies chest pain, SOB, abd pain, N/V, fever, chills, dysuria or any other complaints. All remainder ROS negative.     MEDICATIONS  (STANDING):   heparin   Injectable 5000 Unit(s) SubCutaneous every 8 hours  vancomycin  IVPB 1000 milliGRAM(s) IV Intermittent every 12 hours    MEDICATIONS  (PRN):   acetaminophen     Tablet .. 650 milliGRAM(s) Oral every 6 hours PRN Temp greater or equal to 38C (100.4F), Mild Pain (1 - 3)  aluminum hydroxide/magnesium hydroxide/simethicone Suspension 30 milliLiter(s) Oral every 4 hours PRN Dyspepsia  melatonin 3 milliGRAM(s) Oral at bedtime PRN Insomnia  morphine  - Injectable 2 milliGRAM(s) IV Push every 4 hours PRN Severe Pain (7 - 10)  ondansetron Injectable 4 milliGRAM(s) IV Push every 8 hours PRN Nausea and/or Vomiting        I&O's Summary      PHYSICAL EXAM:   Vital Signs Last 24 Hrs  T(C): 36.7 (19 Aug 2022 07:44), Max: 36.8 (18 Aug 2022 21:12)  T(F): 98 (19 Aug 2022 07:44), Max: 98.2 (18 Aug 2022 21:12)  HR: 82 (19 Aug 2022 07:44) (60 - 82)  BP: 124/85 (19 Aug 2022 07:44) (117/58 - 144/72)  BP(mean): --  RR: 18 (19 Aug 2022 07:44) (18 - 19)  SpO2: 98% (19 Aug 2022 07:44) (95% - 99%)    Parameters below as of 19 Aug 2022 07:44  Patient On (Oxygen Delivery Method): room air        CONSTITUTIONAL: NAD, sitting up in bed   ENMT: Moist oral mucosa, EOMI   RESPIRATORY: Normal respiratory effort; lungs are clear to auscultation bilaterally   CARDIOVASCULAR: Regular rate and rhythm, normal S1 and S2, No lower extremity edema  ABDOMEN: Nontender to palpation, normoactive bowel sounds  MUSCULOSKELETAL:  No clubbing or cyanosis of digits   PSYCH: A+O to person, place, and time; affect appropriate  NEUROLOGY: No gross sensory or motor deficits   SKIN: R axilla with dressing in place mildly soaked, improved swelling       LABS:                        12.3   6.07  )-----------( 347      ( 19 Aug 2022 07:56 )             38.0     08-19    141  |  103  |  12.8  ----------------------------<  79  4.1   |  26.0  |  0.67    Ca    9.1      19 Aug 2022 07:56          Culture - Fungal, Other (collected 17 Aug 2022 14:47)  Source: .Other right axillary abscess #2 (swab)  Preliminary Report (18 Aug 2022 10:46):    Testing in progress    Culture - Abscess with Gram Stain (collected 17 Aug 2022 14:47)  Source: .Abscess right axillary abscess #2 (swab)  Preliminary Report (19 Aug 2022 09:41):    Numerous Staphylococcus aureus    Culture - Fungal, Other (collected 17 Aug 2022 14:46)  Source: .Other right axillary abscess (swab)  Preliminary Report (18 Aug 2022 10:46):    Testing in progress    Culture - Abscess with Gram Stain (collected 17 Aug 2022 14:46)  Source: .Abscess right axillary abscess (swab)  Preliminary Report (18 Aug 2022 20:43):    Few Staphylococcus aureus    
[facility] Division of Hospital Medicine    Chief Complaint:  Cellulitis with abscess     SUBJECTIVE / OVERNIGHT EVENTS:   Pt reports she feels better   No complaints     Patient denies chest pain, SOB, abd pain, N/V, fever, chills, dysuria or any other complaints. All remainder ROS negative.     MEDICATIONS  (STANDING):  heparin   Injectable 5000 Unit(s) SubCutaneous every 8 hours  vancomycin  IVPB 1000 milliGRAM(s) IV Intermittent every 12 hours    MEDICATIONS  (PRN):  acetaminophen     Tablet .. 650 milliGRAM(s) Oral every 6 hours PRN Temp greater or equal to 38C (100.4F), Mild Pain (1 - 3)  aluminum hydroxide/magnesium hydroxide/simethicone Suspension 30 milliLiter(s) Oral every 4 hours PRN Dyspepsia  melatonin 3 milliGRAM(s) Oral at bedtime PRN Insomnia  morphine  - Injectable 2 milliGRAM(s) IV Push every 4 hours PRN Severe Pain (7 - 10)  ondansetron Injectable 4 milliGRAM(s) IV Push every 8 hours PRN Nausea and/or Vomiting        I&O's Summary      PHYSICAL EXAM:  Vital Signs Last 24 Hrs  T(C): 36.9 (20 Aug 2022 08:00), Max: 37.2 (19 Aug 2022 16:30)  T(F): 98.5 (20 Aug 2022 08:00), Max: 98.9 (19 Aug 2022 16:30)  HR: 61 (20 Aug 2022 08:00) (61 - 73)  BP: 112/57 (20 Aug 2022 08:00) (112/57 - 127/71)  BP(mean): --  RR: 18 (20 Aug 2022 08:00) (16 - 18)  SpO2: 95% (20 Aug 2022 08:00) (95% - 98%)    Parameters below as of 20 Aug 2022 08:00  Patient On (Oxygen Delivery Method): room air        CONSTITUTIONAL: NAD, sitting up in bed   ENMT: Moist oral mucosa, EOMI   RESPIRATORY: Normal respiratory effort; lungs are clear to auscultation bilaterally   CARDIOVASCULAR: Regular rate and rhythm, normal S1 and S2, No lower extremity edema  ABDOMEN: Nontender to palpation, normoactive bowel sounds  MUSCULOSKELETAL:  No clubbing or cyanosis of digits   PSYCH: A+O to person, place, and time; affect appropriate  NEUROLOGY: No gross sensory or motor deficits   SKIN: R axilla with dressing in place dry, clean, improved swelling       LABS:                        11.7   5.14  )-----------( 355      ( 20 Aug 2022 05:11 )             37.3     08-20    139  |  102  |  13.7  ----------------------------<  90  4.5   |  26.0  |  0.73    Ca    9.4      20 Aug 2022 05:11      PT/INR - ( 20 Aug 2022 05:11 )   PT: 12.4 sec;   INR: 1.07 ratio         PTT - ( 20 Aug 2022 05:11 )  PTT:31.1 sec          Culture - Fungal, Other (collected 17 Aug 2022 14:47)  Source: .Other right axillary abscess #2 (swab)  Preliminary Report (18 Aug 2022 10:46):    Testing in progress    Culture - Abscess with Gram Stain (collected 17 Aug 2022 14:47)  Source: .Abscess right axillary abscess #2 (swab)  Preliminary Report (20 Aug 2022 10:37):    Numerous Methicillin Resistant Staphylococcus aureus  Organism: Methicillin resistant Staphylococcus aureus (20 Aug 2022 10:37)  Organism: Methicillin resistant Staphylococcus aureus (20 Aug 2022 10:37)    Culture - Fungal, Other (collected 17 Aug 2022 14:46)  Source: .Other right axillary abscess (swab)  Preliminary Report (18 Aug 2022 10:46):    Testing in progress    Culture - Abscess with Gram Stain (collected 17 Aug 2022 14:46)  Source: .Abscess right axillary abscess (swab)  Preliminary Report (19 Aug 2022 19:13):    Few Methicillin Resistant Staphylococcus aureus  Organism: Methicillin resistant Staphylococcus aureus (19 Aug 2022 19:13)  Organism: Methicillin resistant Staphylococcus aureus (19 Aug 2022 19:13)    
INFECTIOUS DISEASES AND INTERNAL MEDICINE at [place]  =======================================================  Musa Akbar MD  Diplomates American Board of Internal Medicine and Infectious Diseases  Telephone [phone]  Fax            [phone]  =======================================================    GURPREET BISHOPACIA [id number]    Follow up: RIGHT AXILLARY INFECTION     Allergies:  No Known Allergies      Medications:  acetaminophen     Tablet .. 650 milliGRAM(s) Oral every 6 hours PRN  ALBUTerol    90 MICROgram(s) HFA Inhaler 2 Puff(s) Inhalation every 6 hours PRN  aluminum hydroxide/magnesium hydroxide/simethicone Suspension 30 milliLiter(s) Oral every 4 hours PRN  aspirin enteric coated 81 milliGRAM(s) Oral daily  heparin   Injectable 5000 Unit(s) SubCutaneous every 12 hours  ketorolac   Injectable 30 milliGRAM(s) IV Push every 8 hours PRN  lactated ringers. 1000 milliLiter(s) IV Continuous <Continuous>  loratadine 10 milliGRAM(s) Oral daily  melatonin 3 milliGRAM(s) Oral at bedtime PRN  meropenem  IVPB 1000 milliGRAM(s) IV Intermittent every 8 hours  morphine  - Injectable 2 milliGRAM(s) IV Push every 4 hours PRN  morphine  - Injectable 4 milliGRAM(s) IV Push every 6 hours PRN  ondansetron Injectable 4 milliGRAM(s) IV Push every 8 hours PRN  pantoprazole    Tablet 40 milliGRAM(s) Oral before breakfast  vancomycin  IVPB 1250 milliGRAM(s) IV Intermittent every 12 hours  vancomycin  IVPB        SOCIAL       FAMILY   FAMILY HISTORY:  FH: breast cancer (Mother)    FH: HTN (hypertension) (Mother)      REVIEW OF SYSTEMS:  CONSTITUTIONAL:  No Fever or chills  HEENT:   No diplopia or blurred vision.  No earache, sore throat or runny nose.  CARDIOVASCULAR:  No pressure, squeezing, strangling, tightness, heaviness or aching about the chest, neck, axilla or epigastrium.  RESPIRATORY:  No cough, shortness of breath, PND or orthopnea.  GASTROINTESTINAL:  No nausea, vomiting or diarrhea.  GENITOURINARY:  No dysuria, frequency or urgency. No Blood in urine  MUSCULOSKELETAL:   moves all joints  SKIN: AS PER HPI   NEUROLOGIC:  No paresthesias, fasciculations, seizures or weakness.  PSYCHIATRIC:  No disorder of thought or mood.  ENDOCRINE:  No heat or cold intolerance, polyuria or polydipsia.  HEMATOLOGICAL:  No easy bruising or bleeding.            Physical Exam:    Vital Signs Last 24 Hrs  T(C): 36.8 (22 Aug 2022 07:35), Max: 37.1 (22 Aug 2022 04:30)  T(F): 98.2 (22 Aug 2022 07:35), Max: 98.7 (22 Aug 2022 04:30)  HR: 56 (22 Aug 2022 07:35) (56 - 73)  BP: 104/67 (22 Aug 2022 07:35) (103/64 - 137/73)  BP(mean): --  RR: 18 (22 Aug 2022 07:35) (17 - 18)  SpO2: 95% (22 Aug 2022 07:35) (95% - 98%)    Parameters below as of 22 Aug 2022 07:35  Patient On (Oxygen Delivery Method): room air        Parameters below as of 19 Aug 2022 07:44  Patient On (Oxygen Delivery Method): room air        O2 Parameters below as of 17 Aug 2022 07:36  Patient On (Oxygen Delivery Method): room air          GEN: NAD,   HEENT: normocephalic and atraumatic. EOMI. LUKASZ.    NECK: Supple. No carotid bruits.  No lymphadenopathy or thyromegaly.  LUNGS: Clear to auscultation.  HEART: Regular rate and rhythm without murmur.  ABDOMEN: Soft, nontender, and nondistended.  Positive bowel sounds.    : No CVA tenderness  EXTREMITIES: Without any cyanosis, clubbing, rash, lesions or edema.  MSK: no joint swelling  NEUROLOGIC: Cranial nerves II through XII are grossly intact.  PSYCHIATRIC: Appropriate affect .  SKIN: RIGHT AXILLA  WOUND PACKED DECREASED EDEMA AND DECREASED INDURATION ARE CLEAN PACKING REMOVED      Lab   =======================================================  Current Antibiotics:     vancomycin  IVPB 1250 milliGRAM(s) IV Intermittent every 12 hours  vancomycin  IVPB        Other medications:  aspirin enteric coated 81 milliGRAM(s) Oral daily  heparin   Injectable 5000 Unit(s) SubCutaneous every 12 hours  lactated ringers. 1000 milliLiter(s) IV Continuous <Continuous>  loratadine 10 milliGRAM(s) Oral daily  pantoprazole    Tablet 40 milliGRAM(s) Oral before breakfast      =======================================================  Labs:                               12.3   6.07  )-----------( 347      ( 19 Aug 2022 07:56 )             38.0   08-19    141  |  103  |  12.8  ----------------------------<  79  4.1   |  26.0  |  0.67    Ca    9.1      19 Aug 2022 07:56        Culture - Blood (collected 08-15-22 @ 12:10)  Source: .Blood Blood-Peripheral    Culture - Blood (collected 08-15-22 @ 12:10)  Source: .Blood Blood-Peripheral      Creatinine, Serum: 0.72 mg/dL (08-17-22 @ 04:42)  Creatinine, Serum: 0.85 mg/dL (08-16-22 @ 04:16)  Creatinine, Serum: 0.76 mg/dL (08-14-22 @ 21:29)            WBC Count: 4.72 K/uL (08-17-22 @ 04:42)  WBC Count: 6.25 K/uL (08-16-22 @ 04:16)  WBC Count: 7.96 K/uL (08-14-22 @ 21:29)    SARS-CoV-2: NotDetec (08-15-22 @ 17:00)      Alkaline Phosphatase, Serum: 73 U/L (08-14-22 @ 21:29)  Alanine Aminotransferase (ALT/SGPT): 24 U/L (08-14-22 @ 21:29)  Aspartate Aminotransferase (AST/SGOT): 23 U/L (08-14-22 @ 21:29)  Bilirubin Total, Serum: <0.2 mg/dL (08-14-22 @ 21:29)  
INFECTIOUS DISEASES AND INTERNAL MEDICINE at [place]  =======================================================  Musa Akbar MD  Diplomates American Board of Internal Medicine and Infectious Diseases  Telephone [phone]  Fax            [phone]  =======================================================    GURPREET BISHOPACIA [id number]    Follow up: RIGHT AXILLARY INFECTION     Allergies:  No Known Allergies      Medications:  acetaminophen     Tablet .. 650 milliGRAM(s) Oral every 6 hours PRN  ALBUTerol    90 MICROgram(s) HFA Inhaler 2 Puff(s) Inhalation every 6 hours PRN  aluminum hydroxide/magnesium hydroxide/simethicone Suspension 30 milliLiter(s) Oral every 4 hours PRN  aspirin enteric coated 81 milliGRAM(s) Oral daily  heparin   Injectable 5000 Unit(s) SubCutaneous every 12 hours  ketorolac   Injectable 30 milliGRAM(s) IV Push every 8 hours PRN  lactated ringers. 1000 milliLiter(s) IV Continuous <Continuous>  loratadine 10 milliGRAM(s) Oral daily  melatonin 3 milliGRAM(s) Oral at bedtime PRN  meropenem  IVPB 1000 milliGRAM(s) IV Intermittent every 8 hours  morphine  - Injectable 2 milliGRAM(s) IV Push every 4 hours PRN  morphine  - Injectable 4 milliGRAM(s) IV Push every 6 hours PRN  ondansetron Injectable 4 milliGRAM(s) IV Push every 8 hours PRN  pantoprazole    Tablet 40 milliGRAM(s) Oral before breakfast  vancomycin  IVPB 1250 milliGRAM(s) IV Intermittent every 12 hours  vancomycin  IVPB        SOCIAL       FAMILY   FAMILY HISTORY:  FH: breast cancer (Mother)    FH: HTN (hypertension) (Mother)      REVIEW OF SYSTEMS:  CONSTITUTIONAL:  No Fever or chills  HEENT:   No diplopia or blurred vision.  No earache, sore throat or runny nose.  CARDIOVASCULAR:  No pressure, squeezing, strangling, tightness, heaviness or aching about the chest, neck, axilla or epigastrium.  RESPIRATORY:  No cough, shortness of breath, PND or orthopnea.  GASTROINTESTINAL:  No nausea, vomiting or diarrhea.  GENITOURINARY:  No dysuria, frequency or urgency. No Blood in urine  MUSCULOSKELETAL:   moves all joints  SKIN: AS PER HPI   NEUROLOGIC:  No paresthesias, fasciculations, seizures or weakness.  PSYCHIATRIC:  No disorder of thought or mood.  ENDOCRINE:  No heat or cold intolerance, polyuria or polydipsia.  HEMATOLOGICAL:  No easy bruising or bleeding.            Physical Exam:  ICU Vital Signs Last 24 Hrs  T(C): 36.6 (17 Aug 2022 07:36), Max: 36.9 (16 Aug 2022 15:53)  T(F): 97.9 (17 Aug 2022 07:36), Max: 98.5 (17 Aug 2022 04:47)  HR: 62 (17 Aug 2022 07:36) (62 - 71)  BP: 129/75 (17 Aug 2022 07:36) (103/67 - 129/82)  BP(mean): --  ABP: --  ABP(mean): --  RR: 20 (17 Aug 2022 07:36) (17 - 20)  SpO2: 97% (17 Aug 2022 07:36) (97% - 98%)    O2 Parameters below as of 17 Aug 2022 07:36  Patient On (Oxygen Delivery Method): room air          GEN: NAD,   HEENT: normocephalic and atraumatic. EOMI. LUKASZ.    NECK: Supple. No carotid bruits.  No lymphadenopathy or thyromegaly.  LUNGS: Clear to auscultation.  HEART: Regular rate and rhythm without murmur.  ABDOMEN: Soft, nontender, and nondistended.  Positive bowel sounds.    : No CVA tenderness  EXTREMITIES: Without any cyanosis, clubbing, rash, lesions or edema.  MSK: no joint swelling  NEUROLOGIC: Cranial nerves II through XII are grossly intact.  PSYCHIATRIC: Appropriate affect .  SKIN: RIGHTT AXILLA TWO AREAS OF ERTHEAM TENDERNESS AND INDURATION         Labs:  Vitals:  ============  T(F): 97.9 (17 Aug 2022 07:36), Max: 98.5 (17 Aug 2022 04:47)  HR: 62 (17 Aug 2022 07:36)  BP: 129/75 (17 Aug 2022 07:36)  RR: 20 (17 Aug 2022 07:36)  SpO2: 97% (17 Aug 2022 07:36) (97% - 98%)  temp max in last 48H T(F): , Max: 99 (08-15-22 @ 15:06)    =======================================================  Current Antibiotics:  meropenem  IVPB 1000 milliGRAM(s) IV Intermittent every 8 hours  vancomycin  IVPB 1250 milliGRAM(s) IV Intermittent every 12 hours  vancomycin  IVPB        Other medications:  aspirin enteric coated 81 milliGRAM(s) Oral daily  heparin   Injectable 5000 Unit(s) SubCutaneous every 12 hours  lactated ringers. 1000 milliLiter(s) IV Continuous <Continuous>  loratadine 10 milliGRAM(s) Oral daily  pantoprazole    Tablet 40 milliGRAM(s) Oral before breakfast      =======================================================  Labs:                        10.8   4.72  )-----------( 308      ( 17 Aug 2022 04:42 )             33.3     08-17    139  |  103  |  12.4  ----------------------------<  101<H>  4.3   |  27.0  |  0.72    Ca    8.7      17 Aug 2022 04:42        Culture - Blood (collected 08-15-22 @ 12:10)  Source: .Blood Blood-Peripheral    Culture - Blood (collected 08-15-22 @ 12:10)  Source: .Blood Blood-Peripheral      Creatinine, Serum: 0.72 mg/dL (08-17-22 @ 04:42)  Creatinine, Serum: 0.85 mg/dL (08-16-22 @ 04:16)  Creatinine, Serum: 0.76 mg/dL (08-14-22 @ 21:29)            WBC Count: 4.72 K/uL (08-17-22 @ 04:42)  WBC Count: 6.25 K/uL (08-16-22 @ 04:16)  WBC Count: 7.96 K/uL (08-14-22 @ 21:29)    SARS-CoV-2: NotDetec (08-15-22 @ 17:00)      Alkaline Phosphatase, Serum: 73 U/L (08-14-22 @ 21:29)  Alanine Aminotransferase (ALT/SGPT): 24 U/L (08-14-22 @ 21:29)  Aspartate Aminotransferase (AST/SGOT): 23 U/L (08-14-22 @ 21:29)  Bilirubin Total, Serum: <0.2 mg/dL (08-14-22 @ 21:29)  
Patient may proceed. Anesthesiologist on the day of will ultimately decide.
Subjective:      STATUS POST:  I & D of R. Axilla abscess    POST OPERATIVE DAY #: 1     MEDICATIONS  (STANDING):  meropenem  IVPB 1000 milliGRAM(s) IV Intermittent every 8 hours  vancomycin  IVPB 1000 milliGRAM(s) IV Intermittent every 12 hours    MEDICATIONS  (PRN):  acetaminophen     Tablet .. 650 milliGRAM(s) Oral every 6 hours PRN Temp greater or equal to 38C (100.4F), Mild Pain (1 - 3)  aluminum hydroxide/magnesium hydroxide/simethicone Suspension 30 milliLiter(s) Oral every 4 hours PRN Dyspepsia  melatonin 3 milliGRAM(s) Oral at bedtime PRN Insomnia  ondansetron Injectable 4 milliGRAM(s) IV Push every 8 hours PRN Nausea and/or Vomiting    Vital Signs Last 24 Hrs  T(C): 36.6 (18 Aug 2022 05:10), Max: 36.8 (17 Aug 2022 17:55)  T(F): 97.9 (18 Aug 2022 05:10), Max: 98.2 (17 Aug 2022 17:55)  HR: 58 (18 Aug 2022 05:10) (58 - 96)  BP: 102/63 (18 Aug 2022 05:10) (102/63 - 156/74)  BP(mean): 86 (17 Aug 2022 18:10) (86 - 96)  RR: 18 (18 Aug 2022 05:10) (12 - 21)  SpO2: 96% (18 Aug 2022 05:10) (95% - 99%)    Parameters below as of 18 Aug 2022 05:10  Patient On (Oxygen Delivery Method): room air    Physical Exam:    Constitutional: NAD, laying in bed   Respiratory: Respirations non-labored, no accessory muscle use  Cardiovascular: Regular rate & rhythm, palpable pulses  Gastrointestinal: Soft, non-tender, non-distended  Extremities: No peripheral edema, clean dressing under the right axilla.   Neurological: A&O x 3; without gross deficit      LABS:                        10.8   4.72  )-----------( 308      ( 17 Aug 2022 04:42 )             33.3     08-17    139  |  103  |  12.4  ----------------------------<  101<H>  4.3   |  27.0  |  0.72    Ca    8.7      17 Aug 2022 04:42          
INTERVAL HPI: No acute events overnight. Still has pain in the R underarm. NPO since midnight.     MEDICATIONS  (STANDING):  aspirin enteric coated 81 milliGRAM(s) Oral daily  heparin   Injectable 5000 Unit(s) SubCutaneous every 12 hours  lactated ringers. 1000 milliLiter(s) (120 mL/Hr) IV Continuous <Continuous>  loratadine 10 milliGRAM(s) Oral daily  meropenem  IVPB 1000 milliGRAM(s) IV Intermittent every 8 hours  pantoprazole    Tablet 40 milliGRAM(s) Oral before breakfast  vancomycin  IVPB 1250 milliGRAM(s) IV Intermittent every 12 hours  vancomycin  IVPB          MEDICATIONS  (PRN):  acetaminophen     Tablet .. 650 milliGRAM(s) Oral every 6 hours PRN Temp greater or equal to 38C (100.4F), Mild Pain (1 - 3)  ALBUTerol    90 MICROgram(s) HFA Inhaler 2 Puff(s) Inhalation every 6 hours PRN Shortness of Breath  aluminum hydroxide/magnesium hydroxide/simethicone Suspension 30 milliLiter(s) Oral every 4 hours PRN Dyspepsia  ketorolac   Injectable 30 milliGRAM(s) IV Push every 8 hours PRN Moderate Pain (4 - 6)  melatonin 3 milliGRAM(s) Oral at bedtime PRN Insomnia  morphine  - Injectable 2 milliGRAM(s) IV Push every 4 hours PRN Moderate Pain (4 - 6)  morphine  - Injectable 4 milliGRAM(s) IV Push every 6 hours PRN Severe Pain (7 - 10)  ondansetron Injectable 4 milliGRAM(s) IV Push every 8 hours PRN Nausea and/or Vomiting      Allergies:  No Known Allergies      PAST MEDICAL & SURGICAL HISTORY:  No pertinent past medical history      HTN (hypertension)      H/O: hysterectomy          FAMILY HISTORY:  FH: breast cancer (Mother)    FH: HTN (hypertension) (Mother)        Physical Exam:  General: NAD, resting comfortably in bed  Pulmonary: Nonlabored breathing, no respiratory distress  Cardiovascular: NSR  Abdominal: soft, NT/ND  Extremities: WWP, R axilla warm, TTP, erythematous, indurated        Vital Signs Last 24 Hrs  T(C): 36.7 (16 Aug 2022 23:00), Max: 36.9 (16 Aug 2022 15:53)  T(F): 98.1 (16 Aug 2022 23:00), Max: 98.4 (16 Aug 2022 15:53)  HR: 63 (16 Aug 2022 23:00) (59 - 71)  BP: 129/82 (16 Aug 2022 23:00) (108/66 - 129/82)  BP(mean): --  RR: 18 (16 Aug 2022 23:00) (17 - 19)  SpO2: 98% (16 Aug 2022 23:00) (96% - 98%)    Parameters below as of 16 Aug 2022 23:00  Patient On (Oxygen Delivery Method): room air        I&O's Summary      LABS:                        10.7   6.25  )-----------( 278      ( 16 Aug 2022 04:16 )             32.8       08-16    142  |  104  |  14.7  ----------------------------<  112<H>  4.6   |  28.0  |  0.85    Ca    9.0      16 Aug 2022 04:16                Culture - Blood (collected 15 Aug 2022 12:10)  Source: .Blood Blood-Peripheral  Preliminary Report (16 Aug 2022 17:02):    No growth to date.    Culture - Blood (collected 15 Aug 2022 12:10)  Source: .Blood Blood-Peripheral  Preliminary Report (16 Aug 2022 17:02):    No growth to date.                      
  Subjective:  Pt offers no acute complaints at this time. Pain well controlled on current regiment. Denies nausea, vomiting, chest pain, SOB, palpitations.       MEDICATIONS  (STANDING):  heparin   Injectable 5000 Unit(s) SubCutaneous every 8 hours  meropenem  IVPB 1000 milliGRAM(s) IV Intermittent every 8 hours  vancomycin  IVPB 1000 milliGRAM(s) IV Intermittent every 12 hours    MEDICATIONS  (PRN):  acetaminophen     Tablet .. 650 milliGRAM(s) Oral every 6 hours PRN Temp greater or equal to 38C (100.4F), Mild Pain (1 - 3)  aluminum hydroxide/magnesium hydroxide/simethicone Suspension 30 milliLiter(s) Oral every 4 hours PRN Dyspepsia  melatonin 3 milliGRAM(s) Oral at bedtime PRN Insomnia  morphine  - Injectable 2 milliGRAM(s) IV Push every 4 hours PRN Severe Pain (7 - 10)  ondansetron Injectable 4 milliGRAM(s) IV Push every 8 hours PRN Nausea and/or Vomiting      Vital Signs Last 24 Hrs  T(C): 36.8 (18 Aug 2022 21:12), Max: 36.8 (18 Aug 2022 07:37)  T(F): 98.2 (18 Aug 2022 21:12), Max: 98.2 (18 Aug 2022 07:37)  HR: 63 (18 Aug 2022 21:12) (58 - 69)  BP: 117/58 (18 Aug 2022 21:12) (102/63 - 119/74)  BP(mean): --  RR: 19 (18 Aug 2022 21:12) (18 - 20)  SpO2: 95% (18 Aug 2022 21:12) (95% - 97%)    Parameters below as of 18 Aug 2022 21:12  Patient On (Oxygen Delivery Method): room air        Physical Exam:    General: Laying comfortably in bed, NAD  HEENT: PERRL, EOMI  Neck: No JVD, FROM without pain  Respiratory: no accessory muscle use, respirations non-labored  Abdomen: soft, non-tender, non-distended  Neurological: A&O x 3; without gross deficit  Extremity: Right axilla dressing clean dry and intact, no signs of active bleeding    A: 67F POD#2 right axillary I&D doing well, no complaints of pain at this time    P:  Continue care per primary  Pain control  OOB as tolerated  Encourage IS  Dressing chages BID  DVT ppx
Subjective: Patient seen and examined at bedside, no acute complaints at this time. Earlier today had a LUE u/s because she was having LUE arm pain and swelling near her IV site. LUE duplex showed no DVT, but a cephalic vein thrombosis in upper arm of antecubital fossa.   Denies chills, chest pain, sob, dyspnea, abdominal pain, n/v/d.     MEDICATIONS  (STANDING):  heparin   Injectable 5000 Unit(s) SubCutaneous every 8 hours  vancomycin  IVPB 1000 milliGRAM(s) IV Intermittent every 12 hours    MEDICATIONS  (PRN):  acetaminophen     Tablet .. 650 milliGRAM(s) Oral every 6 hours PRN Temp greater or equal to 38C (100.4F), Mild Pain (1 - 3)  aluminum hydroxide/magnesium hydroxide/simethicone Suspension 30 milliLiter(s) Oral every 4 hours PRN Dyspepsia  melatonin 3 milliGRAM(s) Oral at bedtime PRN Insomnia  morphine  - Injectable 2 milliGRAM(s) IV Push every 4 hours PRN Severe Pain (7 - 10)  ondansetron Injectable 4 milliGRAM(s) IV Push every 8 hours PRN Nausea and/or Vomiting    Vital Signs Last 24 Hrs  T(C): 36.9 (20 Aug 2022 05:08), Max: 37.2 (19 Aug 2022 16:30)  T(F): 98.5 (20 Aug 2022 05:08), Max: 98.9 (19 Aug 2022 16:30)  HR: 61 (20 Aug 2022 05:08) (61 - 82)  BP: 113/69 (20 Aug 2022 05:08) (113/69 - 127/71)  BP(mean): --  RR: 18 (20 Aug 2022 05:08) (16 - 18)  SpO2: 97% (20 Aug 2022 05:08) (96% - 98%)  Parameters below as of 20 Aug 2022 05:08  Patient On (Oxygen Delivery Method): room air    Physical Exam:    Constitutional: NAD  HEENT: PERRL, EOMI  Neck: No JVD, FROM without pain  Respiratory: Respirations non-labored, no accessory muscle use  Gastrointestinal: Soft, non-tender, non-distended  Neurological: A&O x 3; without gross deficit  Extremity: Right axilla dressing c/d/i    LABS:    8/20 labs pending     A: Patient is a 68yo F POD#3 right axillary I&D doing well.     Plan:   -Continue care per primary  Pain control  - DVT ppx, encourage OOB as tolerated  - Encourage IS  - Dressing chages BID                -  
[facility] Division of Hospital Medicine    Chief Complaint:  Cellulitis with abscess     SUBJECTIVE / OVERNIGHT EVENTS:  Pt report she feels better today, swelling and pain are much improved     Patient denies chest pain, SOB, abd pain, N/V, fever, chills, dysuria or any other complaints. All remainder ROS negative.     MEDICATIONS  (STANDING):  meropenem  IVPB 1000 milliGRAM(s) IV Intermittent every 8 hours  vancomycin  IVPB 1000 milliGRAM(s) IV Intermittent every 12 hours    MEDICATIONS  (PRN):  acetaminophen     Tablet .. 650 milliGRAM(s) Oral every 6 hours PRN Temp greater or equal to 38C (100.4F), Mild Pain (1 - 3)  aluminum hydroxide/magnesium hydroxide/simethicone Suspension 30 milliLiter(s) Oral every 4 hours PRN Dyspepsia  melatonin 3 milliGRAM(s) Oral at bedtime PRN Insomnia  ondansetron Injectable 4 milliGRAM(s) IV Push every 8 hours PRN Nausea and/or Vomiting        I&O's Summary      PHYSICAL EXAM:  Vital Signs Last 24 Hrs  T(C): 36.8 (18 Aug 2022 07:37), Max: 36.8 (17 Aug 2022 17:55)  T(F): 98.2 (18 Aug 2022 07:37), Max: 98.2 (17 Aug 2022 17:55)  HR: 59 (18 Aug 2022 07:37) (58 - 96)  BP: 106/65 (18 Aug 2022 07:37) (102/63 - 156/74)  BP(mean): 86 (17 Aug 2022 18:10) (86 - 96)  RR: 20 (18 Aug 2022 07:37) (12 - 21)  SpO2: 97% (18 Aug 2022 07:37) (95% - 99%)    Parameters below as of 18 Aug 2022 07:37  Patient On (Oxygen Delivery Method): room air          CONSTITUTIONAL: NAD, sitting up in bed   ENMT: Moist oral mucosa, EOMI   RESPIRATORY: Normal respiratory effort; lungs are clear to auscultation bilaterally   CARDIOVASCULAR: Regular rate and rhythm, normal S1 and S2, No lower extremity edema  ABDOMEN: Nontender to palpation, normoactive bowel sounds  MUSCULOSKELETAL:  No clubbing or cyanosis of digits   PSYCH: A+O to person, place, and time; affect appropriate  NEUROLOGY: No gross sensory or motor deficits   SKIN: R axilla with dressing in place moderately soaked, improved swelling       LABS:                        11.0   6.07  )-----------( 335      ( 18 Aug 2022 05:43 )             35.0     08-18    136  |  98  |  16.4  ----------------------------<  180<H>  4.3   |  26.0  |  0.70    Ca    8.9      18 Aug 2022 05:43        Culture - Fungal, Other (collected 17 Aug 2022 14:47)  Source: .Other right axillary abscess #2 (swab)  Preliminary Report (18 Aug 2022 10:46):    Testing in progress    Culture - Fungal, Other (collected 17 Aug 2022 14:46)  Source: .Other right axillary abscess (swab)  Preliminary Report (18 Aug 2022 10:46):    Testing in progress    
INFECTIOUS DISEASES AND INTERNAL MEDICINE at [place]  =======================================================  Musa Akbar MD  Diplomates American Board of Internal Medicine and Infectious Diseases  Telephone [phone]  Fax            [phone]  =======================================================    GURPREET BISHOPACIA [id number]    Follow up: RIGHT AXILLARY INFECTION     Allergies:  No Known Allergies      Medications:  acetaminophen     Tablet .. 650 milliGRAM(s) Oral every 6 hours PRN  ALBUTerol    90 MICROgram(s) HFA Inhaler 2 Puff(s) Inhalation every 6 hours PRN  aluminum hydroxide/magnesium hydroxide/simethicone Suspension 30 milliLiter(s) Oral every 4 hours PRN  aspirin enteric coated 81 milliGRAM(s) Oral daily  heparin   Injectable 5000 Unit(s) SubCutaneous every 12 hours  ketorolac   Injectable 30 milliGRAM(s) IV Push every 8 hours PRN  lactated ringers. 1000 milliLiter(s) IV Continuous <Continuous>  loratadine 10 milliGRAM(s) Oral daily  melatonin 3 milliGRAM(s) Oral at bedtime PRN  meropenem  IVPB 1000 milliGRAM(s) IV Intermittent every 8 hours  morphine  - Injectable 2 milliGRAM(s) IV Push every 4 hours PRN  morphine  - Injectable 4 milliGRAM(s) IV Push every 6 hours PRN  ondansetron Injectable 4 milliGRAM(s) IV Push every 8 hours PRN  pantoprazole    Tablet 40 milliGRAM(s) Oral before breakfast  vancomycin  IVPB 1250 milliGRAM(s) IV Intermittent every 12 hours  vancomycin  IVPB        SOCIAL       FAMILY   FAMILY HISTORY:  FH: breast cancer (Mother)    FH: HTN (hypertension) (Mother)      REVIEW OF SYSTEMS:  CONSTITUTIONAL:  No Fever or chills  HEENT:   No diplopia or blurred vision.  No earache, sore throat or runny nose.  CARDIOVASCULAR:  No pressure, squeezing, strangling, tightness, heaviness or aching about the chest, neck, axilla or epigastrium.  RESPIRATORY:  No cough, shortness of breath, PND or orthopnea.  GASTROINTESTINAL:  No nausea, vomiting or diarrhea.  GENITOURINARY:  No dysuria, frequency or urgency. No Blood in urine  MUSCULOSKELETAL:   moves all joints  SKIN: AS PER HPI   NEUROLOGIC:  No paresthesias, fasciculations, seizures or weakness.  PSYCHIATRIC:  No disorder of thought or mood.  ENDOCRINE:  No heat or cold intolerance, polyuria or polydipsia.  HEMATOLOGICAL:  No easy bruising or bleeding.            Physical Exam:   Vital Signs Last 24 Hrs  T(C): 36.7 (19 Aug 2022 07:44), Max: 36.8 (18 Aug 2022 21:12)  T(F): 98 (19 Aug 2022 07:44), Max: 98.2 (18 Aug 2022 21:12)  HR: 82 (19 Aug 2022 07:44) (60 - 82)  BP: 124/85 (19 Aug 2022 07:44) (117/58 - 144/72)  BP(mean): --  RR: 18 (19 Aug 2022 07:44) (18 - 19)  SpO2: 98% (19 Aug 2022 07:44) (95% - 99%)    Parameters below as of 19 Aug 2022 07:44  Patient On (Oxygen Delivery Method): room air        O2 Parameters below as of 17 Aug 2022 07:36  Patient On (Oxygen Delivery Method): room air          GEN: NAD,   HEENT: normocephalic and atraumatic. EOMI. LUKASZ.    NECK: Supple. No carotid bruits.  No lymphadenopathy or thyromegaly.  LUNGS: Clear to auscultation.  HEART: Regular rate and rhythm without murmur.  ABDOMEN: Soft, nontender, and nondistended.  Positive bowel sounds.    : No CVA tenderness  EXTREMITIES: Without any cyanosis, clubbing, rash, lesions or edema.  MSK: no joint swelling  NEUROLOGIC: Cranial nerves II through XII are grossly intact.  PSYCHIATRIC: Appropriate affect .  SKIN: RIGHTT AXILLA  WOUND PACKED DECREASED EDEMA AND DECREASED INDURATION      Lab   =======================================================  Current Antibiotics:     vancomycin  IVPB 1250 milliGRAM(s) IV Intermittent every 12 hours  vancomycin  IVPB        Other medications:  aspirin enteric coated 81 milliGRAM(s) Oral daily  heparin   Injectable 5000 Unit(s) SubCutaneous every 12 hours  lactated ringers. 1000 milliLiter(s) IV Continuous <Continuous>  loratadine 10 milliGRAM(s) Oral daily  pantoprazole    Tablet 40 milliGRAM(s) Oral before breakfast      =======================================================  Labs:                               12.3   6.07  )-----------( 347      ( 19 Aug 2022 07:56 )             38.0   08-19    141  |  103  |  12.8  ----------------------------<  79  4.1   |  26.0  |  0.67    Ca    9.1      19 Aug 2022 07:56        Culture - Blood (collected 08-15-22 @ 12:10)  Source: .Blood Blood-Peripheral    Culture - Blood (collected 08-15-22 @ 12:10)  Source: .Blood Blood-Peripheral      Creatinine, Serum: 0.72 mg/dL (08-17-22 @ 04:42)  Creatinine, Serum: 0.85 mg/dL (08-16-22 @ 04:16)  Creatinine, Serum: 0.76 mg/dL (08-14-22 @ 21:29)            WBC Count: 4.72 K/uL (08-17-22 @ 04:42)  WBC Count: 6.25 K/uL (08-16-22 @ 04:16)  WBC Count: 7.96 K/uL (08-14-22 @ 21:29)    SARS-CoV-2: NotDetec (08-15-22 @ 17:00)      Alkaline Phosphatase, Serum: 73 U/L (08-14-22 @ 21:29)  Alanine Aminotransferase (ALT/SGPT): 24 U/L (08-14-22 @ 21:29)  Aspartate Aminotransferase (AST/SGOT): 23 U/L (08-14-22 @ 21:29)  Bilirubin Total, Serum: <0.2 mg/dL (08-14-22 @ 21:29)  
INTERVAL HPI/OVERNIGHT EVENTS:    Patient evaluated at bedside. No acute distress. No acute events overnight.  Dressing changed at bedside yesterday with healthy granulation tissue, no signs of fluid recollection    MEDICATIONS  (STANDING):  heparin   Injectable 5000 Unit(s) SubCutaneous every 8 hours  vancomycin  IVPB 1000 milliGRAM(s) IV Intermittent every 12 hours    MEDICATIONS  (PRN):  acetaminophen     Tablet .. 650 milliGRAM(s) Oral every 6 hours PRN Temp greater or equal to 38C (100.4F), Mild Pain (1 - 3)  aluminum hydroxide/magnesium hydroxide/simethicone Suspension 30 milliLiter(s) Oral every 4 hours PRN Dyspepsia  melatonin 3 milliGRAM(s) Oral at bedtime PRN Insomnia  morphine  - Injectable 2 milliGRAM(s) IV Push every 4 hours PRN Severe Pain (7 - 10)  ondansetron Injectable 4 milliGRAM(s) IV Push every 8 hours PRN Nausea and/or Vomiting      Vital Signs Last 24 Hrs  T(C): 36.8 (20 Aug 2022 20:15), Max: 36.9 (20 Aug 2022 05:08)  T(F): 98.2 (20 Aug 2022 20:15), Max: 98.5 (20 Aug 2022 05:08)  HR: 63 (20 Aug 2022 20:15) (61 - 68)  BP: 104/64 (20 Aug 2022 20:15) (104/64 - 113/69)  BP(mean): --  RR: 18 (20 Aug 2022 20:15) (18 - 18)  SpO2: 96% (20 Aug 2022 20:15) (95% - 97%)    Parameters below as of 20 Aug 2022 20:15  Patient On (Oxygen Delivery Method): room air      Physical Exam:    Constitutional: NAD  HEENT: PERRL, EOMI  Neck: No JVD, FROM without pain  Respiratory: Respirations non-labored, no accessory muscle use  Gastrointestinal: Soft, non-tender, non-distended  Neurological: A&O x 3; without gross deficit  Extremity: Right axilla dressing c/d/i      I&O's Detail      LABS:                        11.7   5.14  )-----------( 355      ( 20 Aug 2022 05:11 )             37.3     08-20    139  |  102  |  13.7  ----------------------------<  90  4.5   |  26.0  |  0.73    Ca    9.4      20 Aug 2022 05:11      PT/INR - ( 20 Aug 2022 05:11 )   PT: 12.4 sec;   INR: 1.07 ratio         PTT - ( 20 Aug 2022 05:11 )  PTT:31.1 sec      RADIOLOGY & ADDITIONAL STUDIES:
Subjective: Having pain in axilla. States pain is not improving. Remains on IV abx.     MEDICATIONS  (STANDING):  aspirin enteric coated 81 milliGRAM(s) Oral daily  cefTRIAXone   IVPB 2000 milliGRAM(s) IV Intermittent every 24 hours  heparin   Injectable 5000 Unit(s) SubCutaneous every 12 hours  loratadine 10 milliGRAM(s) Oral daily  pantoprazole    Tablet 40 milliGRAM(s) Oral before breakfast  vancomycin  IVPB 1250 milliGRAM(s) IV Intermittent every 12 hours  vancomycin  IVPB        MEDICATIONS  (PRN):  acetaminophen     Tablet .. 650 milliGRAM(s) Oral every 6 hours PRN Temp greater or equal to 38C (100.4F), Mild Pain (1 - 3)  ALBUTerol    90 MICROgram(s) HFA Inhaler 2 Puff(s) Inhalation every 6 hours PRN Shortness of Breath  aluminum hydroxide/magnesium hydroxide/simethicone Suspension 30 milliLiter(s) Oral every 4 hours PRN Dyspepsia  ketorolac   Injectable 30 milliGRAM(s) IV Push every 8 hours PRN Moderate Pain (4 - 6)  melatonin 3 milliGRAM(s) Oral at bedtime PRN Insomnia  morphine  - Injectable 2 milliGRAM(s) IV Push every 4 hours PRN Moderate Pain (4 - 6)  morphine  - Injectable 4 milliGRAM(s) IV Push every 6 hours PRN Severe Pain (7 - 10)  ondansetron Injectable 4 milliGRAM(s) IV Push every 8 hours PRN Nausea and/or Vomiting      Vital Signs Last 24 Hrs  T(C): 36.9 (15 Aug 2022 20:10), Max: 37.2 (15 Aug 2022 15:06)  T(F): 98.5 (15 Aug 2022 20:10), Max: 99 (15 Aug 2022 15:06)  HR: 70 (15 Aug 2022 20:10) (62 - 70)  BP: 134/80 (15 Aug 2022 20:10) (96/54 - 134/80)  BP(mean): 98 (15 Aug 2022 20:10) (79 - 98)  RR: 18 (15 Aug 2022 20:10) (17 - 18)  SpO2: 99% (15 Aug 2022 20:10) (96% - 99%)    Parameters below as of 15 Aug 2022 20:10  Patient On (Oxygen Delivery Method): room air        Physical Exam:  Constitutional: NAD  HEENT: PERRL, EOMI  Neck: No JVD, FROM without pain  Respiratory: Respirations non-labored, no accessory muscle use  Chest: R axilla warm, tense, and erythematous. Tender to touch and palpation.   Gastrointestinal: Soft, non-tender, non-distended  Extremities: No peripheral edema, No cyanosis  Neurological: A&O x 3; without gross deficit  Musculoskeletal: No joint pain, swelling, deformity, or point tenderness; no limitation of movement      LABS:                        10.9   7.96  )-----------( 258      ( 14 Aug 2022 21:29 )             34.6     08-14    140  |  103  |  10.8  ----------------------------<  116<H>  3.8   |  27.0  |  0.76    Ca    9.4      14 Aug 2022 21:29    TPro  6.8  /  Alb  3.9  /  TBili  <0.2<L>  /  DBili  x   /  AST  23  /  ALT  24  /  AlkPhos  73  08-14    PT/INR - ( 14 Aug 2022 21:29 )   PT: 13.1 sec;   INR: 1.13 ratio         PTT - ( 14 Aug 2022 21:29 )  PTT:28.0 sec

## 2022-08-22 NOTE — DISCHARGE NOTE PROVIDER - NSDCFUADDAPPT_GEN_ALL_CORE_FT
F/u with Dr. Roosevelt Flores in clinic in within 2 weeks.    Daily dressing changes with wet to dry packing with NS and gauze on top.

## 2022-08-22 NOTE — DISCHARGE NOTE PROVIDER - NSDCCPCAREPLAN_GEN_ALL_CORE_FT
PRINCIPAL DISCHARGE DIAGNOSIS  Diagnosis: Abscess of right axilla  Assessment and Plan of Treatment: Admitted with fevers and R axillary abscess.   Pt was seen by Surgery and underwent I&D of R axilla abscess 8/17. Blood cultures (x2) with NGTD. Per Surgery recs pt needs BID dressing changes. Cultures from abscess growing MRSA and she was maintained on Vancomycin and switched to PO antibiotics. Pt will need follow up with Surgery and ID on discharge.      SECONDARY DISCHARGE DIAGNOSES  Diagnosis: Thyroid nodule  Assessment and Plan of Treatment: CT showing: 3.4 cm right thyroid nodule. An additional 1 cm left thyroid nodule is noted. US thyroid as outpt for further work up. Pt should follow up with Dr. Sims Head and Neck attending [facility]    Diagnosis: Breast nodule  Assessment and Plan of Treatment: On palpation nodule present in LLQ (left breast), Reports up to date with mammograms. Pt will need follow up with PCP.     PRINCIPAL DISCHARGE DIAGNOSIS  Diagnosis: Abscess of right axilla  Assessment and Plan of Treatment: Admitted with fevers and R axillary abscess.   Pt was seen by Surgery and underwent I&D of R axilla abscess 8/17. Blood cultures (x2) with NGTD.   Per Surgery recs pt needs daily dressing changes.   Cultures from abscess growing MRSA and she was maintained on Vancomycin and switched to PO antibiotics. Pt will need follow up with Surgery and ID on discharge.      SECONDARY DISCHARGE DIAGNOSES  Diagnosis: Thyroid nodule  Assessment and Plan of Treatment: CT showing: 3.4 cm right thyroid nodule. An additional 1 cm left thyroid nodule is noted. US thyroid as outpt for further work up. Pt should follow up with Dr. Sims Head and Neck attending [facility]    Diagnosis: Breast nodule  Assessment and Plan of Treatment: On palpation nodule present in LLQ (left breast), Reports up to date with mammograms. Pt will need follow up with PCP.

## 2022-08-22 NOTE — DISCHARGE NOTE PROVIDER - ATTENDING DISCHARGE PHYSICAL EXAMINATION:
Vital Signs Last 24 Hrs  T(C): 36.8 (22 Aug 2022 07:35), Max: 37.1 (22 Aug 2022 04:30)  T(F): 98.2 (22 Aug 2022 07:35), Max: 98.7 (22 Aug 2022 04:30)  HR: 56 (22 Aug 2022 07:35) (56 - 73)  BP: 104/67 (22 Aug 2022 07:35) (103/64 - 137/73)  BP(mean): --  RR: 18 (22 Aug 2022 07:35) (17 - 18)  SpO2: 95% (22 Aug 2022 07:35) (95% - 98%)    Parameters below as of 22 Aug 2022 07:35  Patient On (Oxygen Delivery Method): room air    CONSTITUTIONAL: NAD, sitting up in bed   ENMT: Moist oral mucosa, EOMI   RESPIRATORY: Normal respiratory effort; lungs are clear to auscultation bilaterally   CARDIOVASCULAR: Regular rate and rhythm, normal S1 and S2, No lower extremity edema  ABDOMEN: Nontender to palpation, normoactive bowel sounds  MUSCULOSKELETAL:  No clubbing or cyanosis of digits   PSYCH: A+O to person, place, and time; affect appropriate  NEUROLOGY: No gross sensory or motor deficits   SKIN: R axilla with dressing in place dry, clean, improved swelling

## 2022-08-22 NOTE — DISCHARGE NOTE PROVIDER - PROVIDER TOKENS
PROVIDER:[TOKEN:[[id number]]],PROVIDER:[TOKEN:[[id number]]],PROVIDER:[TOKEN:[[id number]]]

## 2022-08-22 NOTE — DISCHARGE NOTE PROVIDER - HOSPITAL COURSE
67 year old female with seasonal allergies presents with 6 days of fevers, rigors and worsening erythema and pain over Rt Axillary space with extension onto back.   R axilla abscess - admitted with fevers and R axillary abscess. Had CT Chest with approximate 5 cm area of fat stranding and fluid noted of the right axilla extending along the right lateral upper chest. Pt was seen by Surgery and underwent I&D of R axilla abscess 8/17. Blood cultures (x2) with NGTD. Per Surgery recs pt needs BID dressing changes. Cultures from abscess growing MRSA and she was maintained on Vancomycin and switched to PO antibiotics. Pt will need follow up with Surgery and ID on discharge.   Reported Leucopenia - Per pt ja is low, current levels would be relative leukocytosis. Appreciate Heme/Onc consult, Outpt f/u with Dr Solano.   Seasonal Allergies   Breast nodule - On palpation nodule present in LLQ (left breast), Reports up to date with mammograms. Pt will need follow up with PCP.   Thyroid nodules - CT showing: 3.4 cm right thyroid nodule. An additional 1 cm left thyroid nodule is noted. US thyroid as outpt for further work up. Pt should follow up with Dr. Sims Head and Neck attending [address] [facility] 67 year old female with seasonal allergies presents with 6 days of fevers, rigors and worsening erythema and pain over Rt Axillary space with extension onto back.   R axilla abscess - admitted with fevers and R axillary abscess. Had CT Chest with approximate 5 cm area of fat stranding and fluid noted of the right axilla extending along the right lateral upper chest. Pt was seen by Surgery and underwent I&D of R axilla abscess 8/17. Blood cultures (x2) with NGTD. Per Surgery recs pt needs daily dressing changes. Cultures from abscess growing MRSA and she was maintained on Vancomycin and switched to PO antibiotics. Pt will need follow up with Surgery and ID on discharge.   Reported Leucopenia - Per pt ja is low, current levels would be relative leukocytosis. Appreciate Heme/Onc consult, Outpt f/u with Dr Solano.   Seasonal Allergies   Breast nodule - On palpation nodule present in LLQ (left breast), Reports up to date with mammograms. Pt will need follow up with PCP.   Thyroid nodules - CT showing: 3.4 cm right thyroid nodule. An additional 1 cm left thyroid nodule is noted. US thyroid as outpt for further work up. Pt should follow up with Dr. Sims Head and Neck attending [address] [facility] 67 year old female with seasonal allergies presents with 6 days of fevers, rigors and worsening erythema and pain over Rt Axillary space with extension onto back.   R axilla abscess - admitted with fevers and R axillary abscess. Had CT Chest with approximate 5 cm area of fat stranding and fluid noted of the right axilla extending along the right lateral upper chest. Pt was seen by Surgery and underwent I&D of R axilla abscess 8/17. Blood cultures (x2) with NGTD. Per Surgery recs pt needs daily dressing changes. Cultures from abscess growing MRSA and she was maintained on Vancomycin and switched to PO antibiotics. Pt will need follow up with Surgery and ID on discharge.   Reported Leucopenia - Per pt ja is low, current levels would be relative leukocytosis. Appreciate Heme/Onc consult, Outpt f/u with Dr Solano.   Seasonal Allergies   Breast nodule - On palpation nodule present in LLQ (left breast), Reports up to date with mammograms. Pt will need follow up with PCP.   Thyroid nodules - CT showing: 3.4 cm right thyroid nodule. An additional 1 cm left thyroid nodule is noted. US thyroid as outpt for further work up. Pt should follow up with Dr. Sims Head and Neck attending [address] [facility]     Patient stable for discharge to home with home care.    Vital Signs Last 24 Hrs  T(C): 36.8 (22 Aug 2022 07:35), Max: 37.1 (22 Aug 2022 04:30)  T(F): 98.2 (22 Aug 2022 07:35), Max: 98.7 (22 Aug 2022 04:30)  HR: 56 (22 Aug 2022 07:35) (56 - 73)  BP: 104/67 (22 Aug 2022 07:35) (103/64 - 137/73)  BP(mean): --  RR: 18 (22 Aug 2022 07:35) (17 - 18)  SpO2: 95% (22 Aug 2022 07:35) (95% - 98%)    Parameters below as of 22 Aug 2022 07:35  Patient On (Oxygen Delivery Method): room air        PHYSICAL EXAM:  GENERAL: NAD  HEAD:  Atraumatic, Normocephalic  NECK: Supple, No JVD, Normal thyroid  NERVOUS SYSTEM:  Alert & Oriented X3, Good concentration; Motor Strength 5/5 B/L upper and lower extremities  CHEST/LUNG: Clear to auscultation bilaterally  HEART: Regular rate and rhythm; No murmurs, rubs, or gallops  ABDOMEN: Soft, Nontender, Nondistended; Bowel sounds present  EXTREMITIES:  2+ Peripheral Pulses, No clubbing, cyanosis, or edema  SKIN: Right axilla with wound clean

## 2022-08-22 NOTE — DISCHARGE NOTE PROVIDER - NSCORESITESY/N_GEN_A_CORE_RD
Jack Cowan is an 52 y.o. female presents for cough, congestion, sinus troubles, ear pain, and dizziness x 1.5 weeks. She feels like the room is spinning which is worse with changing positions. Has had flu shot. Reports fever up to 102. Denies vomiting. Reports falling from dizziness which has resulted in bruising on her leg and her R eye. Questioned whether she was in any harm, and she reported bruising around eye was definitely from fall. No changes in vision, but does need updated prescription glasses. Patient has vitamin B12 deficiency and is due for an injection as well. Review of Systems   General/Constitutional:  +fever. No headache, fatigue       Eyes:   +bruising around R eye. No visual changes, swelling, or discharge      Ears:    +ear pain, tinnitus. No loss or changes in hearing     Respiratory:   +cough. No shortness of breath     GI:   No nausea/vomiting, diarrhea, abdominal pain     :   No dysuria  Neurological:   +dizziness, problems with balance. No loss of consciousness, cognitive changes, or unilateral weakness     Skin: No rash, +bruising on leg and R eye     Allergies   Allergies   Allergen Reactions    Cephalosporins Hives    Doxycycline Swelling    Penicillins Hives    Tetracyclines Nausea and Vomiting       Medications  Current Outpatient Medications   Medication Sig    fluticasone propionate (FLONASE) 50 mcg/actuation nasal spray 2 Sprays by Both Nostrils route daily.  meclizine (ANTIVERT) 25 mg tablet Take 1 Tab by mouth three (3) times daily as needed for Dizziness for up to 10 days.  azithromycin (ZITHROMAX TRI-GOSIA) 500 mg tab Take 1 Tab by mouth daily for 3 days.  gabapentin (NEURONTIN) 100 mg capsule Take 1 Cap by mouth two (2) times a day.  Max Daily Amount: 200 mg.    DULoxetine (CYMBALTA) 60 mg capsule TAKE 2 CAPSULES BY MOUTH IN THE MORNING    cyanocobalamin, vitamin B-12, (B-12 KIT) 1,000 mcg/mL kit 1,000 mcg by Injection route every thirty (30) days.  ibuprofen (MOTRIN IB) 200 mg cap Take  by mouth.  mupirocin calcium (BACTROBAN) 2 % topical cream APPLY A SMALL AMOUNT TO THE AFFECTED AREA BY TOPICAL ROUTE 3 TIMES PER DAY FOR 10 DAYS    baclofen (LIORESAL) 10 mg tablet TAKE 1 TABLET BY MOUTH THREE TIMES A DAY    ondansetron (ZOFRAN ODT) 4 mg disintegrating tablet Take 1 Tab by mouth every eight (8) hours as needed for Nausea.  acetaminophen (TYLENOL EXTRA STRENGTH) 500 mg tablet Take  by mouth every six (6) hours as needed for Pain.  pantoprazole (PROTONIX) 40 mg tablet Take 1 Tab by mouth Before breakfast and dinner.  dextroamphetamine-amphetamine (ADDERALL) 20 mg tablet Take 20 mg by mouth three (3) times daily.  clonazePAM (KLONOPIN) 1 mg tablet Take 1 mg by mouth three (3) times daily.  LORazepam (ATIVAN) 1 mg tablet Take 1 Tab by mouth every eight (8) hours as needed for Anxiety. Max Daily Amount: 3 mg. (Patient taking differently: Take 1 mg by mouth two (2) times daily as needed for Anxiety.)    traZODone (DESYREL) 100 mg tablet Take 50 mg by mouth nightly.  escitalopram oxalate (LEXAPRO) 20 mg tablet Take 20 mg by mouth daily.  albuterol (PROVENTIL HFA, VENTOLIN HFA, PROAIR HFA) 90 mcg/actuation inhaler Take 2 Puffs by inhalation every four (4) hours as needed for Wheezing or Shortness of Breath.  NIFEdipine ER (PROCARDIA XL) 30 mg ER tablet TAKE 1 TABLET BY MOUTH EVERY DAY    naloxone 2 mg/actuation spry Use 1 spray intranasally into 1 nostril. Use a new Narcan nasal spray for subsequent doses and administer into alternating nostrils. May repeat every 2 to 3 minutes as needed.  melatonin 3 mg tablet Take 3 mg by mouth nightly.  simethicone (MYLICON) 80 mg chewable tablet Take 0.5 Tabs by mouth every six (6) hours as needed for Flatulence.  multivitamin capsule Take 1 Cap by mouth daily. No current facility-administered medications for this visit.         Medical History  Past Medical History: Diagnosis Date    Autoimmune disease ([facility]     Crohn's Disease    Crohn disease ([facility] 4/19/2010    Crohn's     Depression 4/19/2010    History of vascular access device 07/18/2017    [facility] VAT - 33 cm right brachial PICC for abx and limited access    Perforation bowel ([facility] 7/4/2017    S/p palak Nelson 7/2017    Vertigo        Immunizations   Immunization History   Administered Date(s) Administered    Influenza Vaccine (Quad) PF 08/30/2017    Pneumococcal Polysaccharide (PPSV-23) 08/30/2017    Tdap 10/08/2014       Objective   Vital Signs  Visit Vitals  /79 (BP 1 Location: Left arm, BP Patient Position: Sitting)   Pulse 78   Temp 98.8 °F (37.1 °C)   Resp 20   Ht 5' 2\" (1.575 m)   Wt 112 lb (50.8 kg)   SpO2 100%   BMI 20.49 kg/m²       Physical Examination  GEN: No apparent distress. Alert and oriented and responds to all questions appropriately. EYES:  Conjunctiva clear; pupils round and reactive to light; extraocular movements are intact. +Nystagmus when looking to R  EAR: External ears are normal. Tympanic membranes are clear and with effusion. OROPHYARYNX: No oral lesions or exudates. +erythema/cobblestoneing  NECK:  Supple        LUNGS: Respirations unlabored; clear to auscultation bilaterally  CARDIOVASCULAR: Regular, rate, and rhythm without murmurs  ABDOMEN: Soft; nontender; nondistended  NEUROLOGIC: Strength and sensation grossly intact. Coordination and gait grossly intact. PSYCH: +labile emotions, very tangential thoughts  EXT: Well perfused. No edema. SKIN: No obvious rashes. +bruising noted on legs and around R eye  Assessment   Onel Valle is a 52 y.o. who presents for sinus troubles >10 days and vertigo. Patient with history of vertigo likely exacerbated now by sinus infection. Will give a z-alpesh as well as reorder her flonase. Advised can use OTC Mucinex DM. Will give B12 shot today since contributing to neurological problems.  Will give a few doses of antivert if vertigo persists. Patient has follow up scheduled in next 2 weeks. Plan   1. Acute non-recurrent sinusitis, unspecified location  - fluticasone propionate (FLONASE) 50 mcg/actuation nasal spray; 2 Sprays by Both Nostrils route daily. Dispense: 1 Bottle; Refill: 1  - azithromycin (ZITHROMAX TRI-GOSIA) 500 mg tab; Take 1 Tab by mouth daily for 3 days. Dispense: 3 Tab; Refill: 0    2. Vertigo  - meclizine (ANTIVERT) 25 mg tablet; Take 1 Tab by mouth three (3) times daily as needed for Dizziness for up to 10 days. Dispense: 10 Tab; Refill: 0    3. Vitamin B12 deficiency  - VITAMIN B12 INJECTION  - LA THER/PROPH/DIAG INJECTION, SUBCUT/IM    Follow-up and Dispositions    · Return in about 12 days (around 11/4/2019) for Follow up with Dr. Malu Morales. I have discussed the aforementioned diagnoses and plan with the patient in detail. I have provided information in person and/or in AVS. All questions answered prior to discharge.       I discussed this patient with Dr. Malu Morales (Attending Physician)   Signed By:  Dennis Kaminski DO    Family Medicine Resident No

## 2022-08-23 LAB
CULTURE RESULTS: SIGNIFICANT CHANGE UP
ORGANISM # SPEC MICROSCOPIC CNT: SIGNIFICANT CHANGE UP
ORGANISM # SPEC MICROSCOPIC CNT: SIGNIFICANT CHANGE UP
SPECIMEN SOURCE: SIGNIFICANT CHANGE UP

## 2022-08-25 ENCOUNTER — APPOINTMENT (OUTPATIENT)
Dept: OTOLARYNGOLOGY | Facility: CLINIC | Age: 67
End: 2022-08-25

## 2022-09-01 ENCOUNTER — APPOINTMENT (OUTPATIENT)
Dept: SURGERY | Facility: CLINIC | Age: 67
End: 2022-09-01

## 2022-09-01 VITALS
BODY MASS INDEX: 25.48 KG/M2 | HEIGHT: 62.5 IN | HEART RATE: 70 BPM | SYSTOLIC BLOOD PRESSURE: 104 MMHG | RESPIRATION RATE: 16 BRPM | OXYGEN SATURATION: 98 % | WEIGHT: 142 LBS | TEMPERATURE: 97.4 F | DIASTOLIC BLOOD PRESSURE: 70 MMHG

## 2022-09-01 PROCEDURE — 99024 POSTOP FOLLOW-UP VISIT: CPT

## 2022-09-15 ENCOUNTER — APPOINTMENT (OUTPATIENT)
Dept: OTOLARYNGOLOGY | Facility: CLINIC | Age: 67
End: 2022-09-15

## 2022-09-15 VITALS
DIASTOLIC BLOOD PRESSURE: 72 MMHG | TEMPERATURE: 98.3 F | BODY MASS INDEX: 23.9 KG/M2 | WEIGHT: 140 LBS | HEART RATE: 71 BPM | HEIGHT: 64 IN | SYSTOLIC BLOOD PRESSURE: 126 MMHG

## 2022-09-15 DIAGNOSIS — Z78.9 OTHER SPECIFIED HEALTH STATUS: ICD-10-CM

## 2022-09-15 DIAGNOSIS — Z80.3 FAMILY HISTORY OF MALIGNANT NEOPLASM OF BREAST: ICD-10-CM

## 2022-09-15 DIAGNOSIS — Z83.3 FAMILY HISTORY OF DIABETES MELLITUS: ICD-10-CM

## 2022-09-15 DIAGNOSIS — J30.9 ALLERGIC RHINITIS, UNSPECIFIED: ICD-10-CM

## 2022-09-15 DIAGNOSIS — F17.200 NICOTINE DEPENDENCE, UNSPECIFIED, UNCOMPLICATED: ICD-10-CM

## 2022-09-15 DIAGNOSIS — E04.1 NONTOXIC SINGLE THYROID NODULE: ICD-10-CM

## 2022-09-15 DIAGNOSIS — Z82.49 FAMILY HISTORY OF ISCHEMIC HEART DISEASE AND OTHER DISEASES OF THE CIRCULATORY SYSTEM: ICD-10-CM

## 2022-09-15 DIAGNOSIS — R13.10 DYSPHAGIA, UNSPECIFIED: ICD-10-CM

## 2022-09-15 DIAGNOSIS — R09.89 OTHER SPECIFIED SYMPTOMS AND SIGNS INVOLVING THE CIRCULATORY AND RESPIRATORY SYSTEMS: ICD-10-CM

## 2022-09-15 PROCEDURE — 99204 OFFICE O/P NEW MOD 45 MIN: CPT | Mod: 25

## 2022-09-15 PROCEDURE — 99244 OFF/OP CNSLTJ NEW/EST MOD 40: CPT | Mod: 25

## 2022-09-15 PROCEDURE — 31575 DIAGNOSTIC LARYNGOSCOPY: CPT

## 2022-09-15 RX ORDER — FEXOFENADINE HCL 60 MG
TABLET ORAL
Refills: 0 | Status: ACTIVE | COMMUNITY

## 2022-09-15 NOTE — REASON FOR VISIT
[Initial Consultation] : an initial consultation for [Other: ______] : provided by NATHAN [FreeTextEntry2] : R thyroid nodule [Interpreters_IDNumber] : [id number] [Interpreters_FullName] : Maryam  [TWNoteComboBox1] : [place]

## 2022-09-15 NOTE — PROCEDURE
[Unable to Cooperate with Mirror] : patient unable to cooperate with mirror [Dysphagia] : dysphagia not clearly evaluated by indirect laryngoscopy [Topical Lidocaine] : topical lidocaine [Oxymetazoline HCl] : oxymetazoline HCl [Flexible Endoscope] : examined with the flexible endoscope [Serial Number: ___] : Serial Number: [unfilled] [True Vocal Cords Paralysis] : no true vocal cord paralysis [True Vocal Cords Erythematous] : no true vocal cord edema [True Vocal Cords Colón's Nodules] : no true vocal cord nodules [Glottis Arytenoid Cartilages] : no arytenoid granulomas [Glottis Arytenoid Cartilages Erythema] : no arytenoid erythema [Normal] : posterior cricoid area had healthy pink mucosa in the interarytenoid area and the esophageal inlet

## 2022-09-15 NOTE — HISTORY OF PRESENT ILLNESS
[de-identified] : 67 year old female presents for follow up after hospital consultation for a R thyroid nodule. States having dysphagia at times with solids feels like she has to force swallow to get the food down, at times has night sweats and chills. States history of sinusitis, daily nasal congestion, cannot breathe through her nose, occasional frontal pressure and pain, anterior rhinorrhea,and poor sense of smell. Last sinus infection was 4 months ago. Currently using Allegra as needs, denies the use of nasal sprays or OTC antacid medication. Denies odynophagia, dysphonia, fevers, or otalgia. Current smoker, smokes 2 cigarettes per day and occasional use of alcohol.

## 2022-09-15 NOTE — PHYSICAL EXAM
[Midline] : trachea located in midline position [Laryngoscopy Performed] : laryngoscopy was performed, see procedure section for findings [Normal] : no rashes [de-identified] : Palpable R thyroid mass [de-identified] : Pale and edematous

## 2022-09-17 LAB
CULTURE RESULTS: SIGNIFICANT CHANGE UP
CULTURE RESULTS: SIGNIFICANT CHANGE UP
SPECIMEN SOURCE: SIGNIFICANT CHANGE UP
SPECIMEN SOURCE: SIGNIFICANT CHANGE UP

## 2022-09-29 ENCOUNTER — RESULT REVIEW (OUTPATIENT)
Age: 67
End: 2022-09-29

## 2022-10-06 ENCOUNTER — APPOINTMENT (OUTPATIENT)
Dept: SURGERY | Facility: CLINIC | Age: 67
End: 2022-10-06

## 2022-10-21 ENCOUNTER — OUTPATIENT (OUTPATIENT)
Dept: OUTPATIENT SERVICES | Facility: HOSPITAL | Age: 67
LOS: 1 days | End: 2022-10-21

## 2022-10-21 ENCOUNTER — APPOINTMENT (OUTPATIENT)
Dept: INTERVENTIONAL RADIOLOGY/VASCULAR | Facility: CLINIC | Age: 67
End: 2022-10-21

## 2022-10-21 ENCOUNTER — RESULT REVIEW (OUTPATIENT)
Age: 67
End: 2022-10-21

## 2022-10-21 DIAGNOSIS — E04.1 NONTOXIC SINGLE THYROID NODULE: ICD-10-CM

## 2022-10-21 DIAGNOSIS — Z90.710 ACQUIRED ABSENCE OF BOTH CERVIX AND UTERUS: Chronic | ICD-10-CM

## 2022-10-21 PROCEDURE — 10005 FNA BX W/US GDN 1ST LES: CPT

## 2022-10-21 PROCEDURE — 88173 CYTOPATH EVAL FNA REPORT: CPT | Mod: 26

## 2022-10-25 LAB — NON-GYNECOLOGICAL CYTOLOGY STUDY: SIGNIFICANT CHANGE UP

## 2022-11-10 ENCOUNTER — NON-APPOINTMENT (OUTPATIENT)
Age: 67
End: 2022-11-10

## 2022-12-08 ENCOUNTER — APPOINTMENT (OUTPATIENT)
Dept: SURGERY | Facility: CLINIC | Age: 67
End: 2022-12-08

## 2022-12-08 VITALS
SYSTOLIC BLOOD PRESSURE: 143 MMHG | HEART RATE: 69 BPM | TEMPERATURE: 96.7 F | DIASTOLIC BLOOD PRESSURE: 85 MMHG | WEIGHT: 140 LBS | HEIGHT: 64 IN | RESPIRATION RATE: 16 BRPM | BODY MASS INDEX: 23.9 KG/M2 | OXYGEN SATURATION: 98 %

## 2022-12-08 PROCEDURE — 99212 OFFICE O/P EST SF 10 MIN: CPT

## 2022-12-08 RX ORDER — CEPHALEXIN 500 MG/1
500 TABLET ORAL
Qty: 40 | Refills: 1 | Status: ACTIVE | COMMUNITY
Start: 2022-12-08 | End: 1900-01-01

## 2023-01-05 ENCOUNTER — APPOINTMENT (OUTPATIENT)
Dept: SURGERY | Facility: CLINIC | Age: 68
End: 2023-01-05

## 2023-04-19 NOTE — ED PROVIDER NOTE - NSICDXNOFAMILYHX_GEN_ALL_ED
Goal: Pt will repsond to orientation questions appropriately by 2 weeks <-- Click to add NO pertinent Family History

## 2023-07-17 ENCOUNTER — EMERGENCY (EMERGENCY)
Facility: HOSPITAL | Age: 68
LOS: 1 days | Discharge: HOME | End: 2023-07-17
Attending: EMERGENCY MEDICINE
Payer: MEDICARE

## 2023-07-17 VITALS
OXYGEN SATURATION: 98 % | DIASTOLIC BLOOD PRESSURE: 61 MMHG | WEIGHT: 143.96 LBS | TEMPERATURE: 98 F | HEIGHT: 64 IN | HEART RATE: 65 BPM | RESPIRATION RATE: 16 BRPM | SYSTOLIC BLOOD PRESSURE: 108 MMHG

## 2023-07-17 DIAGNOSIS — Z90.710 ACQUIRED ABSENCE OF BOTH CERVIX AND UTERUS: Chronic | ICD-10-CM

## 2023-07-17 LAB
ALBUMIN SERPL ELPH-MCNC: 4.5 G/DL — SIGNIFICANT CHANGE UP (ref 3.3–5.2)
ALP SERPL-CCNC: 68 U/L — SIGNIFICANT CHANGE UP (ref 40–120)
ALT FLD-CCNC: 15 U/L — SIGNIFICANT CHANGE UP
ANION GAP SERPL CALC-SCNC: 12 MMOL/L — SIGNIFICANT CHANGE UP (ref 5–17)
APPEARANCE UR: CLEAR — SIGNIFICANT CHANGE UP
AST SERPL-CCNC: 25 U/L — SIGNIFICANT CHANGE UP
BILIRUB SERPL-MCNC: <0.2 MG/DL — LOW (ref 0.4–2)
BILIRUB UR-MCNC: NEGATIVE — SIGNIFICANT CHANGE UP
BUN SERPL-MCNC: 16.8 MG/DL — SIGNIFICANT CHANGE UP (ref 8–20)
CALCIUM SERPL-MCNC: 9.5 MG/DL — SIGNIFICANT CHANGE UP (ref 8.4–10.5)
CHLORIDE SERPL-SCNC: 103 MMOL/L — SIGNIFICANT CHANGE UP (ref 96–108)
CO2 SERPL-SCNC: 26 MMOL/L — SIGNIFICANT CHANGE UP (ref 22–29)
COLOR SPEC: YELLOW — SIGNIFICANT CHANGE UP
CREAT SERPL-MCNC: 1.06 MG/DL — SIGNIFICANT CHANGE UP (ref 0.5–1.3)
DIFF PNL FLD: ABNORMAL
EGFR: 58 ML/MIN/1.73M2 — LOW
EPI CELLS # UR: SIGNIFICANT CHANGE UP
GLUCOSE SERPL-MCNC: 135 MG/DL — HIGH (ref 70–99)
GLUCOSE UR QL: NEGATIVE MG/DL — SIGNIFICANT CHANGE UP
HCT VFR BLD CALC: 41.3 % — SIGNIFICANT CHANGE UP (ref 34.5–45)
HGB BLD-MCNC: 13.2 G/DL — SIGNIFICANT CHANGE UP (ref 11.5–15.5)
KETONES UR-MCNC: NEGATIVE — SIGNIFICANT CHANGE UP
LEUKOCYTE ESTERASE UR-ACNC: NEGATIVE — SIGNIFICANT CHANGE UP
MCHC RBC-ENTMCNC: 27.3 PG — SIGNIFICANT CHANGE UP (ref 27–34)
MCHC RBC-ENTMCNC: 32 GM/DL — SIGNIFICANT CHANGE UP (ref 32–36)
MCV RBC AUTO: 85.5 FL — SIGNIFICANT CHANGE UP (ref 80–100)
NITRITE UR-MCNC: NEGATIVE — SIGNIFICANT CHANGE UP
PH UR: 6 — SIGNIFICANT CHANGE UP (ref 5–8)
PLATELET # BLD AUTO: 285 K/UL — SIGNIFICANT CHANGE UP (ref 150–400)
POTASSIUM SERPL-MCNC: 4.2 MMOL/L — SIGNIFICANT CHANGE UP (ref 3.5–5.3)
POTASSIUM SERPL-SCNC: 4.2 MMOL/L — SIGNIFICANT CHANGE UP (ref 3.5–5.3)
PROT SERPL-MCNC: 7.3 G/DL — SIGNIFICANT CHANGE UP (ref 6.6–8.7)
PROT UR-MCNC: NEGATIVE — SIGNIFICANT CHANGE UP
RBC # BLD: 4.83 M/UL — SIGNIFICANT CHANGE UP (ref 3.8–5.2)
RBC # FLD: 13.1 % — SIGNIFICANT CHANGE UP (ref 10.3–14.5)
RBC CASTS # UR COMP ASSIST: SIGNIFICANT CHANGE UP /HPF (ref 0–4)
SODIUM SERPL-SCNC: 141 MMOL/L — SIGNIFICANT CHANGE UP (ref 135–145)
SP GR SPEC: 1.01 — SIGNIFICANT CHANGE UP (ref 1.01–1.02)
UROBILINOGEN FLD QL: NEGATIVE MG/DL — SIGNIFICANT CHANGE UP
WBC # BLD: 3.91 K/UL — SIGNIFICANT CHANGE UP (ref 3.8–10.5)
WBC # FLD AUTO: 3.91 K/UL — SIGNIFICANT CHANGE UP (ref 3.8–10.5)
WBC UR QL: NEGATIVE /HPF — SIGNIFICANT CHANGE UP (ref 0–5)

## 2023-07-17 PROCEDURE — 96374 THER/PROPH/DIAG INJ IV PUSH: CPT | Mod: XU

## 2023-07-17 PROCEDURE — 36415 COLL VENOUS BLD VENIPUNCTURE: CPT

## 2023-07-17 PROCEDURE — 99284 EMERGENCY DEPT VISIT MOD MDM: CPT | Mod: 25

## 2023-07-17 PROCEDURE — G1004: CPT

## 2023-07-17 PROCEDURE — 80053 COMPREHEN METABOLIC PANEL: CPT

## 2023-07-17 PROCEDURE — 96375 TX/PRO/DX INJ NEW DRUG ADDON: CPT | Mod: XU

## 2023-07-17 PROCEDURE — 85027 COMPLETE CBC AUTOMATED: CPT

## 2023-07-17 PROCEDURE — 87086 URINE CULTURE/COLONY COUNT: CPT

## 2023-07-17 PROCEDURE — 71260 CT THORAX DX C+: CPT | Mod: MG

## 2023-07-17 PROCEDURE — 10061 I&D ABSCESS COMP/MULTIPLE: CPT

## 2023-07-17 PROCEDURE — 71260 CT THORAX DX C+: CPT | Mod: 26,MG

## 2023-07-17 PROCEDURE — 99285 EMERGENCY DEPT VISIT HI MDM: CPT | Mod: 25,GC

## 2023-07-17 PROCEDURE — 81001 URINALYSIS AUTO W/SCOPE: CPT

## 2023-07-17 RX ORDER — CEFTRIAXONE 500 MG/1
1000 INJECTION, POWDER, FOR SOLUTION INTRAMUSCULAR; INTRAVENOUS ONCE
Refills: 0 | Status: COMPLETED | OUTPATIENT
Start: 2023-07-17 | End: 2023-07-17

## 2023-07-17 RX ORDER — ACETAMINOPHEN 500 MG
1000 TABLET ORAL ONCE
Refills: 0 | Status: COMPLETED | OUTPATIENT
Start: 2023-07-17 | End: 2023-07-17

## 2023-07-17 RX ORDER — VANCOMYCIN HCL 1 G
1000 VIAL (EA) INTRAVENOUS ONCE
Refills: 0 | Status: COMPLETED | OUTPATIENT
Start: 2023-07-17 | End: 2023-07-17

## 2023-07-17 RX ORDER — CEPHALEXIN 500 MG
1 CAPSULE ORAL
Qty: 20 | Refills: 0
Start: 2023-07-17 | End: 2023-07-21

## 2023-07-17 RX ORDER — CEFTRIAXONE 500 MG/1
1000 INJECTION, POWDER, FOR SOLUTION INTRAMUSCULAR; INTRAVENOUS ONCE
Refills: 0 | Status: DISCONTINUED | OUTPATIENT
Start: 2023-07-17 | End: 2023-07-17

## 2023-07-17 RX ADMIN — Medication 1000 MILLIGRAM(S): at 16:56

## 2023-07-17 RX ADMIN — Medication 250 MILLIGRAM(S): at 16:19

## 2023-07-17 RX ADMIN — Medication 400 MILLIGRAM(S): at 16:17

## 2023-07-17 RX ADMIN — CEFTRIAXONE 1000 MILLIGRAM(S): 500 INJECTION, POWDER, FOR SOLUTION INTRAMUSCULAR; INTRAVENOUS at 17:11

## 2023-07-17 NOTE — ED PROVIDER NOTE - OBJECTIVE STATEMENT
A 67 year old female pt s/p surgical drainage of right axillary abscess 1 year ago presents to the ED c/o right axillary swelling/pain. Pt had similar swelling and pain 1 year ago secondary to right axillary abscess, requiring IV abx and surgical drainage. Pt states that 1 week ago she began to develop swelling and pain to right upper axilla, slightly superior to site of prior abscess. She states that the pain and swelling did not dissipate, prompting her to go to her PCP who prescribed her Bactrim DS BID, which she has been taking for the past 5 days without relief of symptoms. Pt denies any trauma to the area or any recent fevers but has had chills at home. She has not noted any drainage to area. No CP or SOB. No abd pain, N/V/D. No neck pain. No recent abx use prior to Bactrim. She has not taken anything for pain. No further complaints at this time.

## 2023-07-17 NOTE — ED PROVIDER NOTE - ATTENDING CONTRIBUTION TO CARE
abscess R axilla on bactrim without improvement  hospitalized last year with similar abscess that required surgical intervention and IV AB  pe as doc  will obtain labs imaging prior to I and D  agree w guerda and adria

## 2023-07-17 NOTE — ED ADULT NURSE NOTE - NSFALLUNIVINTERV_ED_ALL_ED
Bed/Stretcher in lowest position, wheels locked, appropriate side rails in place/Call bell, personal items and telephone in reach/Instruct patient to call for assistance before getting out of bed/chair/stretcher/Non-slip footwear applied when patient is off stretcher/[place] to call system/Physically safe environment - no spills, clutter or unnecessary equipment/Purposeful proactive rounding/Room/bathroom lighting operational, light cord in reach

## 2023-07-17 NOTE — ED PROCEDURE NOTE - CPROC ED TIME OUT STATEMENT1
Dr JOSEMANUEL Hanks 2/15/21 @ 0010am due to L leg pain, neuropathy “Patient's name, , procedure and correct site were confirmed during the [facility] Timeout.”

## 2023-07-17 NOTE — ED ADULT NURSE NOTE - OBJECTIVE STATEMENT
pt a+ox3, reports abscess to under right arm x 10 days. reports having happen in past and required chirag and drainage. pt reports intermittent chills, unclear if she had fever.

## 2023-07-17 NOTE — ED PROVIDER NOTE - CLINICAL SUMMARY MEDICAL DECISION MAKING FREE TEXT BOX
67 year old female pt presents with right axillary pain/abscess. Will check labs, CBC, CMP, CT chest w/ IV contrast to evaluate extent of abscess. Will give IV tylenol for pain, IV vanc and ceftriaxone for infection control. Will re-evaluate. 67 year old female pt presents with right axillary pain/abscess. Will check labs, CBC, CMP, CT chest w/ IV contrast to evaluate extent of abscess. Will give IV tylenol for pain, IV vanc and ceftriaxone for infection control. Will re-evaluate.    Joaquin Law PA-C: Pt with collection on CT, no extension, pt wo sig surrounding erythremia or redness, no s/s of superimposed cellulites, Pt with no s/s of systemic infection, earlier culture reviewed and Abx chooses based on old culture, PT with stable VS, no acute distress, non toxic appearing, tolerating PO in the ED, Pt to be dc home with follow up to [facility] clinic,  pt to return in 78 hr for wound check and removal of packing, educated about when to return to the ED if needed. PT verbalizes that he understands all instructions and results. Pt informed that ED is open and available 24/7 365 days a yr, encouraged to return to the ED if they have any change in condition, or feel the need for revaluation.

## 2023-07-17 NOTE — ED PROVIDER NOTE - WET READ LAUNCH FT
7:03pm: Contacted [facility] and they are willing to review for possible admission. Will fax once labs are all back. 7:45pm: [facility] 
 
8:13pm: Contacted [facility] they have received the fax and are waiting on the doctor to call back. 8:31pm: Gabby Forbes, mother, called to get an update. She is about to get in the car and drive up from [facility]. She reports she will be here in about 2 hours and can be reached on her cell phone which is [phone]. 
 
8:45pm: [facility] called and asked if [facility] staff can sign the letter stating that the patient can come back to [facility] once he completes treatment at [facility]. Once staff is able to sign patient can be [facility]. 
 
8:58pm: Informed [facility] that we are waiting on [facility] staff to determine who can sign letter.  
 
Zenia Cervantes [facility] 
 There are no Wet Read(s) to document.

## 2023-07-17 NOTE — ED PROVIDER NOTE - NSFOLLOWUPCLINICS_GEN_ALL_ED_FT
[facility] Acute Care Surgery  Acute Care Surgery  [address] [place] [id number]  Phone: [phone]  Fax:

## 2023-07-17 NOTE — ED PROVIDER NOTE - PATIENT PORTAL LINK FT
You can access the FollowMyHealth Patient Portal offered by [facility] by registering at the following website: [email or web address]. By joining [facility]’s FollowMyHealth portal, you will also be able to view your health information using other applications (apps) compatible with our system.

## 2023-07-17 NOTE — ED PROVIDER NOTE - ENMT, MLM
Airway patent, Nasal mucosa clear. Mouth with normal mucosa. Throat has no vesicles, no oropharyngeal exudates and uvula is midline. No neck tenderness or cervical lymphadenopathy

## 2023-07-17 NOTE — ED PROVIDER NOTE - SIGN-OUT TIME
Patient:   ISMAEL EARL            MRN: [id number]            FIN: [id number]              Age:   76 years     Sex:  MALE     :  47   Associated Diagnoses:   None   Author:   LIBRA BLOOD     Subjective   Chief complaint feels well  no diarrhea  breathing fine.       Health Status   Allergies:    Allergies (1) Active Reaction  penicillin Rash    Current medications:    Medications (20) Active  Scheduled: (13)  *Magnesium Protocol Communication  1 each, N/A, Daily  *Magnesium Protocol Communication  1 each, N/A, Daily  Albuterol 0.083% 2.5 mg/3 mL nebulizer soln  2.5 mg 3 mL, Nebulizer, Daily  AmLODIPine 5 mg tab  5 mg 1 tab, Oral, Daily  Atorvastatin 80 mg tab  80 mg 1 tab, Oral, Q Bedtime  cefepime  2,000 mg, IVPB, Q12H  DexaMETHasone 1 mg tab  2 mg 2 tab, Oral, Daily  Ezetimibe 10 mg tab  10 mg 1 tab, Oral, Q Bedtime  fluconazole-NaCl 0.9%  400 mg 200 mL, IVPB, Q24H  Folic acid 1 mg tab  1 mg 1 tab, Oral, Daily  Mirtazapine 15 mg tab  15 mg 1 tab, Oral, Q Bedtime  Pantoprazole 40 mg DR tab  40 mg 1 tab, Oral, Daily  vancomycin  1,000 mg, IVPB, Q18H  Continuous: (0)  PRN: (7)  Albuterol 0.083% 2.5 mg/3 mL nebulizer soln  2.5 mg 3 mL, Nebulizer, Q4H  Atropine 1 mg/10 mL syringe SDV  0.5 mg 5 mL, IV Push, As Directed PRN  Dextrose (glucose) 40% 15 gm/37.5 gm oral gel UD  15 gm, Oral, As Directed PRN  Dextrose (glucose) 50% 25 gm/50 mL syringe  12.5 gm 25 mL, IV Push, As Directed PRN  Glucagon 1 mg/1 mL emergency kit SDV  1 mg 1 mL, IM, As Directed PRN  Loperamide 2 mg/15 mL oral liquid repack  2 mg 15 mL, Oral, TID  NitroGLYCERIN 0.4 mg SL tab 25s  0.4 mg 1 tab, SL, As Directed PRN      Objective   Intake and Output   I&O 24 hr   I & O between:  2019 16:44 TO 2019 16:44  Med Dosing Weight:  123.2  kg   13-NOV-2019  24 Hour Intake:   1779.35  ( 14.44 mL/kg )  24 Hour Output:   2250.00           24 Hour Urine/Stool Output:   0.0  24 Hour Balance:   -470.65           24 Hour Urine  Output:   2250.00  ( 0.76 mL/kg/hr )    Dosing Weight:   123.2 kg    11/13/2019 14:20  Most Recent Clinical Weight:   125.5  kg    11/16/2019 05:55    Previous Clinical Weight:   126.6  kg 11/15/2019 06:01  Changed:   -   %0.87    VS/Measurements     Vitals between:   17-NOV-2019 16:44:21   TO   18-NOV-2019 16:44:21                   LAST RESULT MINIMUM MAXIMUM  Temperature 36.6 36.3 36.6  Heart Rate 77 55 77  Respiratory Rate 17 16 21  NISBP           121 121 162  NIDBP           69 69 88  SpO2                    97 95 98    General:  Alert and oriented, No acute distress.    Respiratory:  Lungs are clear to auscultation, Respirations are non-labored.   Cardiovascular:  Normal rate, Regular rhythm, No murmur.    Gastrointestinal:  Soft, Non-tender, Non-distended.    Integumentary:  Warm.    Neurologic:  Alert.    Psychiatric:  Cooperative.      Results Review   General results   Today's results   11/18/19 08:23 CST Glucose Bedside (POC) 76 mg/dL   11/18/19 06:00 CST Sodium 140 mmol/L    Potassium 3.9 mmol/L    Chloride 112 mmol/L  HI    Carbon Dioxide (CO2) 21 mmol/L    Anion Gap 11 mmol/L    BUN 18 mg/dL    Creatinine 1.23 mg/dL  HI    Calcium 8.2 mg/dL  LOW    Magnesium 1.4 mg/dL  LOW    Glucose Level 86 mg/dL    WBC 3.4 THOUSAND/mcL  LOW    Hemoglobin 7.8 gm/dL  LOW    Hematocrit 24.2 %  LOW    Platelet 13 THOUSAND/mcL  CRIT   11/17/19 21:03 CST Glucose Bedside (POC) 121 mg/dL  HI   11/17/19 05:50 CST Platelet 16 THOUSAND/mcL  CRIT    Thyroid Stimulating Hormone 0.497 mcunit/mL    Free T4 0.9 ng/dL       Impression and Plan   Assessment and Plan:       Diagnosis:   Sepsis, resolved  Dehydration, improved  Mental status change, acute, resolved, baseline  Bradycardia, resolved  Resolved hyperglycemia  Hypomagnesemia  Hypocalcemia, minimal when corrected for albumin level  Pancytopenia, a bit better  Severe acute diarrhea, resolved  Metastatic small cell lung cancer  Acute kidney injury, improved  CKD  Borderline  troponin, flat pattern, likely due to chronic kidney disease  Plan:  Doing well  Breathing fine  No chills  Ambulated good today  No diarrhea  Possible D/C tomorrow if platelets better  Antibiotics per ID   .   17-Jul-2023 16:00

## 2023-07-17 NOTE — ED PROVIDER NOTE - NSFOLLOWUPINSTRUCTIONS_ED_ALL_ED_FT
Skin Abscess: Care Instructions  Picture of layers and structures of the skin  Overview  A skin abscess is a bacterial infection that forms a pocket of pus. A boil is a kind of skin abscess. The doctor may have cut an opening in the abscess so that the pus can drain out. You may have gauze in the cut so that the abscess will stay open and keep draining. You may need antibiotics. You will need to follow up with your doctor to make sure the infection has gone away.    The doctor has checked you carefully, but problems can develop later. If you notice any problems or new symptoms, get medical treatment right away.    Follow-up care is a key part of your treatment and safety. Be sure to make and go to all appointments, and call your doctor or nurse advice line (260 in most provinces and territories) if you are having problems. It's also a good idea to know your test results and keep a list of the medicines you take.    How can you care for yourself at home?  Apply warm and dry compresses, a heating pad set on low, or a hot water bottle 3 or 4 times a day for pain. Keep a cloth between the heat source and your skin.  If your doctor prescribed antibiotics, take them as directed. Do not stop taking them just because you feel better. You need to take the full course of antibiotics.  Take pain medicines exactly as directed.  If the doctor gave you a prescription medicine for pain, take it as prescribed.  If you are not taking a prescription pain medicine, ask your doctor if you can take an over-the-counter medicine.  Keep your bandage clean and dry. Change the bandage whenever it gets wet or dirty, or at least one time a day.  If the abscess was packed with gauze:  Keep follow-up appointments to have the gauze changed or removed. If the doctor instructed you to remove the gauze, follow the instructions you were given for how to remove it.  After the gauze is removed, soak the area in warm water for 15 to 20 minutes 2 times a day, until the wound closes.  When should you call for help?  	  Call your doctor or nurse advice line now or seek immediate medical care if:    You have signs of worsening infection, such as:  Increased pain, swelling, warmth, or redness.  Red streaks leading from the infected skin.  Pus draining from the wound.  A fever.  Watch closely for changes in your health, and be sure to contact your doctor or nurse advice line if:    You do not get better as expected.

## 2023-07-17 NOTE — ED PROVIDER NOTE - SKIN, MLM
1 cm fluctuant area to right upper axilla with significant tenderness w/ tenderness to rest of axilla, extending to area over prior surgical scar. No surrounding erythema or streaking. No purulent drainage. Mild lymphadenopathy to inferior aspect of the axilla. No left axillary involvement

## 2023-07-17 NOTE — ED ADULT NURSE NOTE - NS_NURSE_DISC_ED_ALL_ED_PROVIDEDBY
Eat vegetables, reduce bread, pasta, rice, simple sugars    Drink plenty of water.    Low risk exam and EKG so congratulations you look great and good luck for surgery.    Hold Avapro day of surgery, continue perioperative Crestor 20mg.     ACP

## 2023-07-18 LAB
CULTURE RESULTS: SIGNIFICANT CHANGE UP
SPECIMEN SOURCE: SIGNIFICANT CHANGE UP

## 2023-07-20 ENCOUNTER — EMERGENCY (EMERGENCY)
Facility: HOSPITAL | Age: 68
LOS: 1 days | Discharge: HOME | End: 2023-07-20
Attending: EMERGENCY MEDICINE
Payer: MEDICARE

## 2023-07-20 VITALS
HEART RATE: 77 BPM | WEIGHT: 164.91 LBS | RESPIRATION RATE: 16 BRPM | SYSTOLIC BLOOD PRESSURE: 104 MMHG | OXYGEN SATURATION: 99 % | HEIGHT: 64 IN | TEMPERATURE: 98 F | DIASTOLIC BLOOD PRESSURE: 64 MMHG

## 2023-07-20 DIAGNOSIS — Z90.710 ACQUIRED ABSENCE OF BOTH CERVIX AND UTERUS: Chronic | ICD-10-CM

## 2023-07-20 LAB
ALBUMIN SERPL ELPH-MCNC: 4.5 G/DL — SIGNIFICANT CHANGE UP (ref 3.3–5.2)
ALP SERPL-CCNC: 68 U/L — SIGNIFICANT CHANGE UP (ref 40–120)
ALT FLD-CCNC: 14 U/L — SIGNIFICANT CHANGE UP
ANION GAP SERPL CALC-SCNC: 14 MMOL/L — SIGNIFICANT CHANGE UP (ref 5–17)
APTT BLD: 29.2 SEC — SIGNIFICANT CHANGE UP (ref 27.5–35.5)
AST SERPL-CCNC: 19 U/L — SIGNIFICANT CHANGE UP
BASOPHILS # BLD AUTO: 0.01 K/UL — SIGNIFICANT CHANGE UP (ref 0–0.2)
BASOPHILS NFR BLD AUTO: 0.3 % — SIGNIFICANT CHANGE UP (ref 0–2)
BILIRUB SERPL-MCNC: <0.2 MG/DL — LOW (ref 0.4–2)
BUN SERPL-MCNC: 12.7 MG/DL — SIGNIFICANT CHANGE UP (ref 8–20)
CALCIUM SERPL-MCNC: 9.6 MG/DL — SIGNIFICANT CHANGE UP (ref 8.4–10.5)
CHLORIDE SERPL-SCNC: 103 MMOL/L — SIGNIFICANT CHANGE UP (ref 96–108)
CO2 SERPL-SCNC: 25 MMOL/L — SIGNIFICANT CHANGE UP (ref 22–29)
CREAT SERPL-MCNC: 0.84 MG/DL — SIGNIFICANT CHANGE UP (ref 0.5–1.3)
EGFR: 77 ML/MIN/1.73M2 — SIGNIFICANT CHANGE UP
EOSINOPHIL # BLD AUTO: 0.15 K/UL — SIGNIFICANT CHANGE UP (ref 0–0.5)
EOSINOPHIL NFR BLD AUTO: 4.2 % — SIGNIFICANT CHANGE UP (ref 0–6)
GLUCOSE BLDC GLUCOMTR-MCNC: 114 MG/DL — HIGH (ref 70–99)
GLUCOSE BLDC GLUCOMTR-MCNC: 86 MG/DL — SIGNIFICANT CHANGE UP (ref 70–99)
GLUCOSE SERPL-MCNC: 65 MG/DL — LOW (ref 70–99)
HCT VFR BLD CALC: 42.2 % — SIGNIFICANT CHANGE UP (ref 34.5–45)
HGB BLD-MCNC: 13.4 G/DL — SIGNIFICANT CHANGE UP (ref 11.5–15.5)
IMM GRANULOCYTES NFR BLD AUTO: 0.3 % — SIGNIFICANT CHANGE UP (ref 0–0.9)
INR BLD: 1.04 RATIO — SIGNIFICANT CHANGE UP (ref 0.88–1.16)
LYMPHOCYTES # BLD AUTO: 1.13 K/UL — SIGNIFICANT CHANGE UP (ref 1–3.3)
LYMPHOCYTES # BLD AUTO: 31.7 % — SIGNIFICANT CHANGE UP (ref 13–44)
MCHC RBC-ENTMCNC: 27 PG — SIGNIFICANT CHANGE UP (ref 27–34)
MCHC RBC-ENTMCNC: 31.8 GM/DL — LOW (ref 32–36)
MCV RBC AUTO: 85.1 FL — SIGNIFICANT CHANGE UP (ref 80–100)
MONOCYTES # BLD AUTO: 0.26 K/UL — SIGNIFICANT CHANGE UP (ref 0–0.9)
MONOCYTES NFR BLD AUTO: 7.3 % — SIGNIFICANT CHANGE UP (ref 2–14)
NEUTROPHILS # BLD AUTO: 2.01 K/UL — SIGNIFICANT CHANGE UP (ref 1.8–7.4)
NEUTROPHILS NFR BLD AUTO: 56.2 % — SIGNIFICANT CHANGE UP (ref 43–77)
PLATELET # BLD AUTO: 278 K/UL — SIGNIFICANT CHANGE UP (ref 150–400)
POTASSIUM SERPL-MCNC: 3.9 MMOL/L — SIGNIFICANT CHANGE UP (ref 3.5–5.3)
POTASSIUM SERPL-SCNC: 3.9 MMOL/L — SIGNIFICANT CHANGE UP (ref 3.5–5.3)
PROT SERPL-MCNC: 7.6 G/DL — SIGNIFICANT CHANGE UP (ref 6.6–8.7)
PROTHROM AB SERPL-ACNC: 12.1 SEC — SIGNIFICANT CHANGE UP (ref 10.5–13.4)
RBC # BLD: 4.96 M/UL — SIGNIFICANT CHANGE UP (ref 3.8–5.2)
RBC # FLD: 13.3 % — SIGNIFICANT CHANGE UP (ref 10.3–14.5)
SODIUM SERPL-SCNC: 142 MMOL/L — SIGNIFICANT CHANGE UP (ref 135–145)
WBC # BLD: 3.57 K/UL — LOW (ref 3.8–10.5)
WBC # FLD AUTO: 3.57 K/UL — LOW (ref 3.8–10.5)

## 2023-07-20 PROCEDURE — 73030 X-RAY EXAM OF SHOULDER: CPT | Mod: 26,RT

## 2023-07-20 PROCEDURE — 99223 1ST HOSP IP/OBS HIGH 75: CPT | Mod: FS

## 2023-07-20 RX ORDER — MORPHINE SULFATE 50 MG/1
4 CAPSULE, EXTENDED RELEASE ORAL ONCE
Refills: 0 | Status: DISCONTINUED | OUTPATIENT
Start: 2023-07-20 | End: 2023-07-20

## 2023-07-20 RX ORDER — DIPHENHYDRAMINE HCL 50 MG
25 CAPSULE ORAL EVERY 6 HOURS
Refills: 0 | Status: DISCONTINUED | OUTPATIENT
Start: 2023-07-20 | End: 2023-07-27

## 2023-07-20 RX ORDER — ACETAMINOPHEN 500 MG
650 TABLET ORAL EVERY 6 HOURS
Refills: 0 | Status: DISCONTINUED | OUTPATIENT
Start: 2023-07-20 | End: 2023-07-27

## 2023-07-20 RX ORDER — SODIUM CHLORIDE 9 MG/ML
1000 INJECTION INTRAMUSCULAR; INTRAVENOUS; SUBCUTANEOUS ONCE
Refills: 0 | Status: COMPLETED | OUTPATIENT
Start: 2023-07-20 | End: 2023-07-20

## 2023-07-20 RX ORDER — KETOROLAC TROMETHAMINE 30 MG/ML
15 SYRINGE (ML) INJECTION EVERY 6 HOURS
Refills: 0 | Status: DISCONTINUED | OUTPATIENT
Start: 2023-07-20 | End: 2023-07-20

## 2023-07-20 RX ADMIN — MORPHINE SULFATE 4 MILLIGRAM(S): 50 CAPSULE, EXTENDED RELEASE ORAL at 11:57

## 2023-07-20 RX ADMIN — Medication 650 MILLIGRAM(S): at 17:25

## 2023-07-20 RX ADMIN — Medication 25 MILLIGRAM(S): at 20:19

## 2023-07-20 RX ADMIN — Medication 100 MILLIGRAM(S): at 11:57

## 2023-07-20 RX ADMIN — Medication 15 MILLIGRAM(S): at 18:25

## 2023-07-20 RX ADMIN — MORPHINE SULFATE 4 MILLIGRAM(S): 50 CAPSULE, EXTENDED RELEASE ORAL at 14:26

## 2023-07-20 RX ADMIN — SODIUM CHLORIDE 1000 MILLILITER(S): 9 INJECTION INTRAMUSCULAR; INTRAVENOUS; SUBCUTANEOUS at 11:57

## 2023-07-20 RX ADMIN — Medication 100 MILLIGRAM(S): at 21:26

## 2023-07-20 NOTE — ED STATDOCS - OBJECTIVE STATEMENT
Serum (Optional): Hydrating B5 Gel 66 y/o female with PMHx of presents to the ED for evaluation of site where she had I&D for right axillary cyst on 7/17/23. Pt notes that redness to arm has increased, notes she had fever of 101 last night. Pt with hx of similar last year which required surgical drainage, IV antibiotics for MRSA.

## 2023-07-20 NOTE — ED STATDOCS - CLINICAL SUMMARY MEDICAL DECISION MAKING FREE TEXT BOX
66 y/o female with cellulitis s/p I&D 2 days ago failed treatment with keflex and bactrim, will check labs, treat with clindamycin, likely place in obs for trial of IV antibiotics

## 2023-07-20 NOTE — ED CDU PROVIDER INITIAL DAY NOTE - CLINICAL SUMMARY MEDICAL DECISION MAKING FREE TEXT BOX
68 y/o female with cellulitis s/p I&D 2 days ago failed treatment with keflex and bactrim. Placed on observation for IV clindamycin.

## 2023-07-20 NOTE — ED ADULT NURSE NOTE - NSFALLUNIVINTERV_ED_ALL_ED
Bed/Stretcher in lowest position, wheels locked, appropriate side rails in place/Call bell, personal items and telephone in reach/Instruct patient to call for assistance before getting out of bed/chair/stretcher/Non-slip footwear applied when patient is off stretcher/[place] to call system/Physically safe environment - no spills, clutter or unnecessary equipment/Purposeful proactive rounding/Room/bathroom lighting operational, light cord in reach

## 2023-07-20 NOTE — ED CDU PROVIDER INITIAL DAY NOTE - ATTENDING APP SHARED VISIT CONTRIBUTION OF CARE
I agree with the PA's note and was available for any issues/concerns. I was directly involved in patient care. My brief overall assessment is as follows:    67 year old female no PMHx c/o right axilla rash and pain; initial work up reviewed; admit to obs for IV abx and reassessment

## 2023-07-20 NOTE — ED ADULT NURSE NOTE - OBJECTIVE STATEMENT
Pt care acquired at 1100. Pt is A&Ox4 and is resting in bed. Pt arrives in the ED with right arm pain with cellulitis. Pt states she had a procedure done to remove drainage from a cyst in her right arm last week. Pt states the pain makes adl more difficult. Pt states she was started on abx post procedure. VSS. Pt breathing unlabored on room air. Pt denies fevers.

## 2023-07-20 NOTE — ED STATDOCS - PHYSICAL EXAMINATION
Gen: NAD, AOx3  Head: NCAT  Lung: CTAB, no respiratory distress,   CV: normal s1/s2, rrr, no murmurs, Normal perfusion, pulses 2+ throughout  Abd: soft, NTND  MSK: No edema, no visible deformities, full range of motion in all 4 extremities  Neuro: CN II-XII grossly intact, No focal neurologic deficits  Skin: right axilla wound with packing in pack, surrounding induration and erythema and warmth extending to right mid humerus   Psych: normal affect

## 2023-07-20 NOTE — ED CDU PROVIDER INITIAL DAY NOTE - OBJECTIVE STATEMENT
67F with no pmh presenting for wound check after I&D of right axillary cyst abscess 7/17/23.  Pt notes that redness to arm has increased, notes she had fever of 101 last night. Pt with hx of similar last year which required surgical drainage, IV antibiotics for MRSA.

## 2023-07-21 VITALS
DIASTOLIC BLOOD PRESSURE: 84 MMHG | RESPIRATION RATE: 18 BRPM | SYSTOLIC BLOOD PRESSURE: 154 MMHG | TEMPERATURE: 98 F | HEART RATE: 68 BPM | OXYGEN SATURATION: 98 %

## 2023-07-21 LAB
CULTURE RESULTS: NO GROWTH — SIGNIFICANT CHANGE UP
GLUCOSE BLDC GLUCOMTR-MCNC: 89 MG/DL — SIGNIFICANT CHANGE UP (ref 70–99)
SPECIMEN SOURCE: SIGNIFICANT CHANGE UP

## 2023-07-21 PROCEDURE — 36415 COLL VENOUS BLD VENIPUNCTURE: CPT

## 2023-07-21 PROCEDURE — 82962 GLUCOSE BLOOD TEST: CPT

## 2023-07-21 PROCEDURE — 96374 THER/PROPH/DIAG INJ IV PUSH: CPT

## 2023-07-21 PROCEDURE — G0378: CPT

## 2023-07-21 PROCEDURE — 87040 BLOOD CULTURE FOR BACTERIA: CPT

## 2023-07-21 PROCEDURE — 87086 URINE CULTURE/COLONY COUNT: CPT

## 2023-07-21 PROCEDURE — 73030 X-RAY EXAM OF SHOULDER: CPT

## 2023-07-21 PROCEDURE — 80053 COMPREHEN METABOLIC PANEL: CPT

## 2023-07-21 PROCEDURE — 96376 TX/PRO/DX INJ SAME DRUG ADON: CPT

## 2023-07-21 PROCEDURE — 85730 THROMBOPLASTIN TIME PARTIAL: CPT

## 2023-07-21 PROCEDURE — 99238 HOSP IP/OBS DSCHRG MGMT 30/<: CPT

## 2023-07-21 PROCEDURE — 96375 TX/PRO/DX INJ NEW DRUG ADDON: CPT

## 2023-07-21 PROCEDURE — 99284 EMERGENCY DEPT VISIT MOD MDM: CPT | Mod: 25

## 2023-07-21 PROCEDURE — 85610 PROTHROMBIN TIME: CPT

## 2023-07-21 PROCEDURE — 85025 COMPLETE CBC W/AUTO DIFF WBC: CPT

## 2023-07-21 RX ORDER — IBUPROFEN 200 MG
1 TABLET ORAL
Qty: 20 | Refills: 0
Start: 2023-07-21 | End: 2023-07-25

## 2023-07-21 RX ADMIN — Medication 100 MILLIGRAM(S): at 10:19

## 2023-07-21 NOTE — ED ADULT NURSE REASSESSMENT NOTE - COMFORT CARE
ambulated to bathroom
plan of care explained/po fluids offered/repositioned/treatment delay explained/wait time explained/warm blanket provided

## 2023-07-21 NOTE — ED ADULT NURSE REASSESSMENT NOTE - NS ED NURSE REASSESS COMMENT FT1
Patient received from AM RN. Patient is alert, oriented times four, resting in bed. Redness and swelling noted to R axilla area, patient also complaint of itchiness, provider COMPA Robles made aware, benadryl given as per orders. Most recent vital signs are stable and as charted. Patient safety maintained, will continue to monitor.
Patient resting in bed comfortably, no acute distress noted at this time, patient states that her itching has improved with oral benadryl, no other acute distress noted patient refusing Tylenol at this time. Most recent vital signs are stable and as charted. Will continue to monitor.
pt handed off to LINDA Gould in stable condition. pt in no apparent distress noted at this time. vital signs stable. pt transferred to observation in stable condition.
Pt received in the stretcher resting comfortably , no  distress noted, no chest pain reported. Awaiting IV Anitbiotic and discharge home today. Will continue to monitor

## 2023-07-21 NOTE — ED CDU PROVIDER SUBSEQUENT DAY NOTE - CLINICAL SUMMARY MEDICAL DECISION MAKING FREE TEXT BOX
68 y/o female with cellulitis s/p I&D 2 days ago failed treatment with keflex and bactrim. Placed on observation for IV clindamycin.   cellulitis is improving

## 2023-07-21 NOTE — ED CDU PROVIDER SUBSEQUENT DAY NOTE - HISTORY
no event over night - pt is redness  improving-  hx of + MRSA infection  +itching that improved by benadryl

## 2023-07-21 NOTE — ED CDU PROVIDER DISPOSITION NOTE - CLINICAL COURSE
66 y/o female with no PMHx of presents to the ED for evaluation right axilla where she had I&D for right axillary cyst on 7/17/23. Pt notes that redness to arm has increased and she had a fever at home. In ED, labs unremarkable. Placed in obs for IV antibiotics. Pt feeling improved and cellulitis improving. Rx for po clindamycin sent. Pt to FU with pcp.

## 2023-07-21 NOTE — ED CDU PROVIDER DISPOSITION NOTE - ATTENDING CONTRIBUTION TO CARE
I agree with the PA's note and was available for any issues/concerns. I was directly involved in patient care. My brief overall assessment is as follows:       Patient with resolving cellulitis s/p 24 hours of clindamycin, regan Morse DO

## 2023-07-21 NOTE — ED CDU PROVIDER DISPOSITION NOTE - PATIENT PORTAL LINK FT
You can access the FollowMyHealth Patient Portal offered by [facility] by registering at the following website: [email or web address]. By joining "[employer]’s FollowMyHealth portal, you will also be able to view your health information using other applications (apps) compatible with our system.

## 2023-07-21 NOTE — ED CDU PROVIDER SUBSEQUENT DAY NOTE - ATTENDING APP SHARED VISIT CONTRIBUTION OF CARE
I agree with the PA's note and was available for any issues/concerns. I was directly involved in patient care. My brief overall assessment is as follows:     Pt with h/o MRSA infection presenting with cellulitis s/p I&D R axilla cyst. Improved with IV clindamycin- will dc home with PO clindamycin. Denise Morse DO

## 2023-07-25 ENCOUNTER — APPOINTMENT (OUTPATIENT)
Dept: TRAUMA SURGERY | Facility: CLINIC | Age: 68
End: 2023-07-25
Payer: MEDICARE

## 2023-07-25 VITALS
RESPIRATION RATE: 16 BRPM | DIASTOLIC BLOOD PRESSURE: 85 MMHG | WEIGHT: 144 LBS | BODY MASS INDEX: 24.59 KG/M2 | TEMPERATURE: 97.6 F | SYSTOLIC BLOOD PRESSURE: 125 MMHG | HEIGHT: 64 IN | HEART RATE: 70 BPM | OXYGEN SATURATION: 97 %

## 2023-07-25 DIAGNOSIS — L02.91 CUTANEOUS ABSCESS, UNSPECIFIED: ICD-10-CM

## 2023-07-25 PROCEDURE — 99213 OFFICE O/P EST LOW 20 MIN: CPT

## 2023-07-26 PROBLEM — L02.91 ABSCESS OF SKIN: Status: ACTIVE | Noted: 2022-12-08

## 2023-07-26 NOTE — PHYSICAL EXAM
[Normal Breath Sounds] : Normal breath sounds [Normal Heart Sounds] : normal heart sounds [No Rash or Lesion] : No rash or lesion [Purpura] : no purpura  [Petechiae] : no petechiae [Skin Ulcer] : no ulcer [Skin Induration] : no induration [Alert] : alert [Oriented to Person] : oriented to person [Oriented to Place] : oriented to place [Oriented to Time] : oriented to time [Calm] : calm [de-identified] : wdwn  female  in  nad [de-identified] : non icteric sclera [de-identified] : FROM   no  palpable lymph nodes [de-identified] : n [de-identified] : FROM of  right  arm and  shoulder [de-identified] : right  axilla    several scars noted  from previous I and D     most recent I and D   c/d/i   no  signs  of  cellultis   no active  discharge  and  or   bleeding   palpable  hard area    non tender no  new areas of  abscess formation  no palpable  lymphadenopathy to axilla

## 2023-07-26 NOTE — ASSESSMENT
[FreeTextEntry1] : RTC if abscess redevelops \par F/u PMD \par Maintain yearly  mammography\par Discussion with patient   All questions answered  Any acute symptoms and or concerns patient understands  to call back office  and  or  go  directly to [facility] ED\par

## 2023-07-26 NOTE — HISTORY OF PRESENT ILLNESS
[FreeTextEntry1] : Patient presents  to [facility]  clinic for  recheck s/p I and D to  right  axilla  abscess  Patient  at time  of  this exam  denies  any  fevers no chills  Free movement  of  right  arm  Patient  denies  any  acute  discomfort   or  physical changes Patient has had several occurrence and development  of abscess  to  right  axilla  Last year patient taken  to t he  OR for right axilla abscess

## 2024-02-08 NOTE — DISCHARGE NOTE PROVIDER - NSDCHHHOMEBOUND_GEN_ALL_CORE
"It was my pleasure seeing you in the [facility] today.  We will focus on addressing the following symptoms discussed today:   shortness of breath, cough, rhinitis, palpitations, dizziness, brain fog, fatigue, chronic pain, numbness and tingling, tremors    My recommendations are as follows:  -Vitamin B12 is low, I prescribed a daily supplement for this   -repeat bloodwork: DERREK  -referral to Dr. Carter with pain management  -prescription Azelastine nose spray to be added to daily Flonase and Zyrtec, if no improvement in this will refer to ENT  -dizziness exercises  -let me know when you are ready for referral to Physical Therapy and Speech Therapy  -discuss sleep apnea treatment with Dr. Herron    Tips to help improve brain fog and fatigue:  --avoid drinking Alcohol while recovering from COVID  --ensure to practice 30 minutes of exercise 7 days per week to keep BNDGF (brain derived neurotrophic growth factor) elevated as this will help in the regeneration of neurons, you may split exercise time up into 5 minute increments if this is better tolerated.   --Focus on eating a whole foods rich in fiber such as vegetables, fruits, beans, nuts, legumes, seeds, whole grains. Avoid processed foods and beverages. Eliminate added sugars, artificial sweeteners, processed oils, artificial dyes.  --slowly increase your activity by no more than 10% per week, rest when you feel tired  --utilize pacing techniques to manage fatigue, schedule rest times throughout the day so you do not run out of energy, more information to be found on this here: [email or web address]  --Review the  Health Talk on Managing Fatigue and Thinking Changes after COVID-19 here: [email or web address]://[email or web address]/Health-Talks/articles/2022/05/managing-fatigue-and-thinking-changes-after-covid-19  --You can also find many helpful tips and tricks in this book: \"The Long COVID self-help guide, practical "
OK to take Imodium if needed for diarrhea     Use after a loose bowel movement     If diarrhea not gone in a week return for testing of parasites and cultures ( containers)   
"ways to manage symptoms\" by [facility] from the [facility]  --another book you may find helpful: \"Clearing the Fog\" by Dr. Delio Calloway     We will send a message in [employer] or call you with the results of your tests.  Further recommendations will follow based on testing results and your symptoms.   Please return to [facility] Recovery Clinic in 3 months, call [phone] or send a message through your [employer] bibi if needed.    Please also consider attending  [facility] support group for long-COVID and post-ICU patients. To contact support group, email [email or web address] or call [phone]   "
Other, specify...

## 2024-06-20 NOTE — ED ADULT NURSE REASSESSMENT NOTE - NURSING MUSC ROM
GI Genius artificial intelligence technology used to enhance polyp detection
full range of motion in all extremities

## 2024-06-27 ENCOUNTER — EMERGENCY (EMERGENCY)
Facility: HOSPITAL | Age: 69
LOS: 1 days | Discharge: HOME | End: 2024-06-27
Attending: EMERGENCY MEDICINE
Payer: MEDICARE

## 2024-06-27 VITALS
HEART RATE: 69 BPM | OXYGEN SATURATION: 100 % | DIASTOLIC BLOOD PRESSURE: 90 MMHG | SYSTOLIC BLOOD PRESSURE: 158 MMHG | RESPIRATION RATE: 19 BRPM | TEMPERATURE: 98 F

## 2024-06-27 VITALS
SYSTOLIC BLOOD PRESSURE: 170 MMHG | WEIGHT: 140.21 LBS | HEART RATE: 73 BPM | TEMPERATURE: 98 F | DIASTOLIC BLOOD PRESSURE: 94 MMHG | RESPIRATION RATE: 18 BRPM | OXYGEN SATURATION: 97 % | HEIGHT: 64 IN

## 2024-06-27 DIAGNOSIS — Z90.710 ACQUIRED ABSENCE OF BOTH CERVIX AND UTERUS: Chronic | ICD-10-CM

## 2024-06-27 LAB
ALBUMIN SERPL ELPH-MCNC: 4.4 G/DL — SIGNIFICANT CHANGE UP (ref 3.3–5.2)
ALP SERPL-CCNC: 67 U/L — SIGNIFICANT CHANGE UP (ref 40–120)
ALT FLD-CCNC: 11 U/L — SIGNIFICANT CHANGE UP
ANION GAP SERPL CALC-SCNC: 10 MMOL/L — SIGNIFICANT CHANGE UP (ref 5–17)
ANISOCYTOSIS BLD QL: SLIGHT — SIGNIFICANT CHANGE UP
APPEARANCE UR: CLEAR — SIGNIFICANT CHANGE UP
AST SERPL-CCNC: 20 U/L — SIGNIFICANT CHANGE UP
BACTERIA # UR AUTO: NEGATIVE /HPF — SIGNIFICANT CHANGE UP
BASOPHILS # BLD AUTO: 0 K/UL — SIGNIFICANT CHANGE UP (ref 0–0.2)
BASOPHILS NFR BLD AUTO: 0 % — SIGNIFICANT CHANGE UP (ref 0–2)
BILIRUB SERPL-MCNC: <0.2 MG/DL — LOW (ref 0.4–2)
BILIRUB UR-MCNC: NEGATIVE — SIGNIFICANT CHANGE UP
BUN SERPL-MCNC: 16 MG/DL — SIGNIFICANT CHANGE UP (ref 8–20)
CALCIUM SERPL-MCNC: 9.3 MG/DL — SIGNIFICANT CHANGE UP (ref 8.4–10.5)
CAST: 0 /LPF — SIGNIFICANT CHANGE UP (ref 0–4)
CHLORIDE SERPL-SCNC: 101 MMOL/L — SIGNIFICANT CHANGE UP (ref 96–108)
CO2 SERPL-SCNC: 28 MMOL/L — SIGNIFICANT CHANGE UP (ref 22–29)
COLOR SPEC: YELLOW — SIGNIFICANT CHANGE UP
CREAT SERPL-MCNC: 0.77 MG/DL — SIGNIFICANT CHANGE UP (ref 0.5–1.3)
DIFF PNL FLD: ABNORMAL
EGFR: 85 ML/MIN/1.73M2 — SIGNIFICANT CHANGE UP
EOSINOPHIL # BLD AUTO: 0.19 K/UL — SIGNIFICANT CHANGE UP (ref 0–0.5)
EOSINOPHIL NFR BLD AUTO: 6.9 % — HIGH (ref 0–6)
GIANT PLATELETS BLD QL SMEAR: PRESENT — SIGNIFICANT CHANGE UP
GLUCOSE SERPL-MCNC: 91 MG/DL — SIGNIFICANT CHANGE UP (ref 70–99)
GLUCOSE UR QL: NEGATIVE MG/DL — SIGNIFICANT CHANGE UP
HCT VFR BLD CALC: 40.6 % — SIGNIFICANT CHANGE UP (ref 34.5–45)
HGB BLD-MCNC: 13.1 G/DL — SIGNIFICANT CHANGE UP (ref 11.5–15.5)
KETONES UR-MCNC: NEGATIVE MG/DL — SIGNIFICANT CHANGE UP
LEUKOCYTE ESTERASE UR-ACNC: ABNORMAL
LIDOCAIN IGE QN: 35 U/L — SIGNIFICANT CHANGE UP (ref 22–51)
LYMPHOCYTES # BLD AUTO: 1.44 K/UL — SIGNIFICANT CHANGE UP (ref 1–3.3)
LYMPHOCYTES # BLD AUTO: 52.2 % — HIGH (ref 13–44)
MANUAL SMEAR VERIFICATION: SIGNIFICANT CHANGE UP
MCHC RBC-ENTMCNC: 27.1 PG — SIGNIFICANT CHANGE UP (ref 27–34)
MCHC RBC-ENTMCNC: 32.3 GM/DL — SIGNIFICANT CHANGE UP (ref 32–36)
MCV RBC AUTO: 83.9 FL — SIGNIFICANT CHANGE UP (ref 80–100)
MICROCYTES BLD QL: SLIGHT — SIGNIFICANT CHANGE UP
MONOCYTES # BLD AUTO: 0.24 K/UL — SIGNIFICANT CHANGE UP (ref 0–0.9)
MONOCYTES NFR BLD AUTO: 8.7 % — SIGNIFICANT CHANGE UP (ref 2–14)
NEUTROPHILS # BLD AUTO: 0.89 K/UL — LOW (ref 1.8–7.4)
NEUTROPHILS NFR BLD AUTO: 32.2 % — LOW (ref 43–77)
NITRITE UR-MCNC: NEGATIVE — SIGNIFICANT CHANGE UP
PH UR: 6.5 — SIGNIFICANT CHANGE UP (ref 5–8)
PLAT MORPH BLD: NORMAL — SIGNIFICANT CHANGE UP
PLATELET # BLD AUTO: 281 K/UL — SIGNIFICANT CHANGE UP (ref 150–400)
POLYCHROMASIA BLD QL SMEAR: SLIGHT — SIGNIFICANT CHANGE UP
POTASSIUM SERPL-MCNC: 4 MMOL/L — SIGNIFICANT CHANGE UP (ref 3.5–5.3)
POTASSIUM SERPL-SCNC: 4 MMOL/L — SIGNIFICANT CHANGE UP (ref 3.5–5.3)
PROT SERPL-MCNC: 6.9 G/DL — SIGNIFICANT CHANGE UP (ref 6.6–8.7)
PROT UR-MCNC: NEGATIVE MG/DL — SIGNIFICANT CHANGE UP
RBC # BLD: 4.84 M/UL — SIGNIFICANT CHANGE UP (ref 3.8–5.2)
RBC # FLD: 13.2 % — SIGNIFICANT CHANGE UP (ref 10.3–14.5)
RBC BLD AUTO: ABNORMAL
RBC CASTS # UR COMP ASSIST: 0 /HPF — SIGNIFICANT CHANGE UP (ref 0–4)
SMUDGE CELLS # BLD: PRESENT — SIGNIFICANT CHANGE UP
SODIUM SERPL-SCNC: 139 MMOL/L — SIGNIFICANT CHANGE UP (ref 135–145)
SP GR SPEC: 1.01 — SIGNIFICANT CHANGE UP (ref 1–1.03)
SQUAMOUS # UR AUTO: 0 /HPF — SIGNIFICANT CHANGE UP (ref 0–5)
UROBILINOGEN FLD QL: 0.2 MG/DL — SIGNIFICANT CHANGE UP (ref 0.2–1)
WBC # BLD: 2.75 K/UL — LOW (ref 3.8–10.5)
WBC # FLD AUTO: 2.75 K/UL — LOW (ref 3.8–10.5)
WBC UR QL: 2 /HPF — SIGNIFICANT CHANGE UP (ref 0–5)

## 2024-06-27 PROCEDURE — 99285 EMERGENCY DEPT VISIT HI MDM: CPT | Mod: FS

## 2024-06-27 PROCEDURE — 83690 ASSAY OF LIPASE: CPT

## 2024-06-27 PROCEDURE — 74177 CT ABD & PELVIS W/CONTRAST: CPT | Mod: 26,MC

## 2024-06-27 PROCEDURE — 99284 EMERGENCY DEPT VISIT MOD MDM: CPT | Mod: 25

## 2024-06-27 PROCEDURE — 96374 THER/PROPH/DIAG INJ IV PUSH: CPT | Mod: XU

## 2024-06-27 PROCEDURE — 85025 COMPLETE CBC W/AUTO DIFF WBC: CPT

## 2024-06-27 PROCEDURE — 74177 CT ABD & PELVIS W/CONTRAST: CPT | Mod: MC

## 2024-06-27 PROCEDURE — 80053 COMPREHEN METABOLIC PANEL: CPT

## 2024-06-27 PROCEDURE — 87086 URINE CULTURE/COLONY COUNT: CPT

## 2024-06-27 PROCEDURE — 81001 URINALYSIS AUTO W/SCOPE: CPT

## 2024-06-27 PROCEDURE — 36415 COLL VENOUS BLD VENIPUNCTURE: CPT

## 2024-06-27 RX ORDER — KETOROLAC TROMETHAMINE 30 MG/ML
30 INJECTION, SOLUTION INTRAMUSCULAR ONCE
Refills: 0 | Status: DISCONTINUED | OUTPATIENT
Start: 2024-06-27 | End: 2024-06-27

## 2024-06-27 RX ORDER — POLYETHYLENE GLYCOL 3350 1 G/G
17 POWDER ORAL
Qty: 119 | Refills: 0
Start: 2024-06-27 | End: 2024-07-03

## 2024-06-27 RX ORDER — BISACODYL 5 MG
1 TABLET, DELAYED RELEASE (ENTERIC COATED) ORAL
Qty: 7 | Refills: 0
Start: 2024-06-27 | End: 2024-07-03

## 2024-06-27 RX ORDER — DICYCLOMINE HCL 10 MG
2 CAPSULE ORAL
Qty: 30 | Refills: 0
Start: 2024-06-27 | End: 2024-07-01

## 2024-06-27 RX ORDER — ACETAMINOPHEN 325 MG
975 TABLET ORAL ONCE
Refills: 0 | Status: COMPLETED | OUTPATIENT
Start: 2024-06-27 | End: 2024-06-27

## 2024-06-27 RX ADMIN — Medication 975 MILLIGRAM(S): at 17:33

## 2024-06-27 RX ADMIN — KETOROLAC TROMETHAMINE 30 MILLIGRAM(S): 30 INJECTION, SOLUTION INTRAMUSCULAR at 17:33

## 2024-06-28 LAB
CULTURE RESULTS: SIGNIFICANT CHANGE UP
SPECIMEN SOURCE: SIGNIFICANT CHANGE UP

## 2025-05-19 ENCOUNTER — APPOINTMENT (OUTPATIENT)
Dept: GASTROENTEROLOGY | Facility: CLINIC | Age: 70
End: 2025-05-19
Payer: MEDICARE

## 2025-05-19 VITALS
OXYGEN SATURATION: 98 % | RESPIRATION RATE: 16 BRPM | SYSTOLIC BLOOD PRESSURE: 120 MMHG | WEIGHT: 150 LBS | HEART RATE: 70 BPM | DIASTOLIC BLOOD PRESSURE: 70 MMHG | BODY MASS INDEX: 25.61 KG/M2 | HEIGHT: 64 IN

## 2025-05-19 DIAGNOSIS — Z12.11 ENCOUNTER FOR SCREENING FOR MALIGNANT NEOPLASM OF COLON: ICD-10-CM

## 2025-05-19 DIAGNOSIS — Z86.0100 PERSONAL HISTORY OF COLON POLYPS, UNSPECIFIED: ICD-10-CM

## 2025-05-19 DIAGNOSIS — R13.10 DYSPHAGIA, UNSPECIFIED: ICD-10-CM

## 2025-05-19 PROCEDURE — 99213 OFFICE O/P EST LOW 20 MIN: CPT

## 2025-05-19 PROCEDURE — 99203 OFFICE O/P NEW LOW 30 MIN: CPT

## 2025-05-19 RX ORDER — POLYETHYLENE GLYCOL 3350, SODIUM SULFATE, POTASSIUM CHLORIDE, MAGNESIUM SULFATE, AND SODIUM CHLORIDE FOR ORAL SOLUTION 178.7-7.3G
178.7 KIT ORAL
Qty: 1 | Refills: 0 | Status: ACTIVE | COMMUNITY
Start: 2025-05-19 | End: 1900-01-01

## 2025-07-16 ENCOUNTER — APPOINTMENT (OUTPATIENT)
Dept: GASTROENTEROLOGY | Facility: GI CENTER | Age: 70
End: 2025-07-16
Payer: MEDICARE

## 2025-07-16 ENCOUNTER — RESULT REVIEW (OUTPATIENT)
Age: 70
End: 2025-07-16

## 2025-07-16 ENCOUNTER — OUTPATIENT (OUTPATIENT)
Dept: OUTPATIENT SERVICES | Facility: HOSPITAL | Age: 70
LOS: 1 days | End: 2025-07-16
Payer: MEDICARE

## 2025-07-16 ENCOUNTER — TRANSCRIPTION ENCOUNTER (OUTPATIENT)
Age: 70
End: 2025-07-16

## 2025-07-16 DIAGNOSIS — R13.10 DYSPHAGIA, UNSPECIFIED: ICD-10-CM

## 2025-07-16 DIAGNOSIS — Z12.11 ENCOUNTER FOR SCREENING FOR MALIGNANT NEOPLASM OF COLON: ICD-10-CM

## 2025-07-16 DIAGNOSIS — Z90.710 ACQUIRED ABSENCE OF BOTH CERVIX AND UTERUS: Chronic | ICD-10-CM

## 2025-07-16 PROCEDURE — 88342 IMHCHEM/IMCYTCHM 1ST ANTB: CPT | Mod: 26

## 2025-07-16 PROCEDURE — 88360 TUMOR IMMUNOHISTOCHEM/MANUAL: CPT | Mod: 26,59

## 2025-07-16 PROCEDURE — 45380 COLONOSCOPY AND BIOPSY: CPT | Mod: PT

## 2025-07-16 PROCEDURE — 88360 TUMOR IMMUNOHISTOCHEM/MANUAL: CPT

## 2025-07-16 PROCEDURE — 43239 EGD BIOPSY SINGLE/MULTIPLE: CPT

## 2025-07-16 PROCEDURE — 88305 TISSUE EXAM BY PATHOLOGIST: CPT | Mod: 26

## 2025-07-16 PROCEDURE — 88342 IMHCHEM/IMCYTCHM 1ST ANTB: CPT

## 2025-07-16 PROCEDURE — 88341 IMHCHEM/IMCYTCHM EA ADD ANTB: CPT

## 2025-07-16 PROCEDURE — 43239 EGD BIOPSY SINGLE/MULTIPLE: CPT | Mod: 59

## 2025-07-16 PROCEDURE — 88341 IMHCHEM/IMCYTCHM EA ADD ANTB: CPT | Mod: 26

## 2025-07-16 PROCEDURE — 88305 TISSUE EXAM BY PATHOLOGIST: CPT

## 2025-07-23 LAB — SURGICAL PATHOLOGY STUDY: SIGNIFICANT CHANGE UP

## (undated) DEVICE — BLADE SURGICAL #10 CARBON

## (undated) DEVICE — DEFENDO AIR/WATER, SUCTION BIOPSY CONN KIT DISP

## (undated) DEVICE — SOL IRR BAG NS 0.9% 3000ML

## (undated) DEVICE — BLANKET WARMER FULL ADULT

## (undated) DEVICE — WRAP COMPRESSION CALF MED

## (undated) DEVICE — DRAPE HALF SHEET 40X57"

## (undated) DEVICE — CULTURESWAB WITHOUT CHARCOAL

## (undated) DEVICE — GLV 7 PROTEXIS

## (undated) DEVICE — FORCEP ENDOJAW AGTR LC W NDL 2.8MM 230CM DISP

## (undated) DEVICE — BLADE SURGICAL #15 CARBON

## (undated) DEVICE — DRAPE 3/4 SHEET 52X76"

## (undated) DEVICE — CULTURESWAB WITH CHARCOAL